# Patient Record
Sex: FEMALE | Race: BLACK OR AFRICAN AMERICAN | NOT HISPANIC OR LATINO | Employment: UNEMPLOYED | URBAN - METROPOLITAN AREA
[De-identification: names, ages, dates, MRNs, and addresses within clinical notes are randomized per-mention and may not be internally consistent; named-entity substitution may affect disease eponyms.]

---
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
## 2019-01-08 ENCOUNTER — HISTORICAL (OUTPATIENT)
Dept: ADMINISTRATIVE | Facility: HOSPITAL | Age: 19
End: 2019-01-08

## 2019-01-08 LAB
ALBUMIN SERPL-MCNC: 1.9 GM/DL (ref 3.4–5)
ALBUMIN/GLOB SERPL: 0 RATIO (ref 1–2)
ALP SERPL-CCNC: 115 UNIT/L (ref 45–117)
ALT SERPL-CCNC: <6 UNIT/L (ref 12–78)
AST SERPL-CCNC: 7 UNIT/L (ref 15–37)
BILIRUB SERPL-MCNC: 0.2 MG/DL (ref 0.2–1)
BILIRUBIN DIRECT+TOT PNL SERPL-MCNC: <0.1 MG/DL
BILIRUBIN DIRECT+TOT PNL SERPL-MCNC: ABNORMAL MG/DL
BUN SERPL-MCNC: 12 MG/DL (ref 7–18)
CALCIUM SERPL-MCNC: 8.4 MG/DL (ref 8.5–10.1)
CHLORIDE SERPL-SCNC: 93 MMOL/L (ref 98–107)
CO2 SERPL-SCNC: 28 MMOL/L (ref 21–32)
CREAT SERPL-MCNC: 0.8 MG/DL (ref 0.6–1.3)
DEPRECATED CALCIDIOL+CALCIFEROL SERPL-MC: 24.83 NG/ML (ref 30–80)
EST. AVERAGE GLUCOSE BLD GHB EST-MCNC: 192 MG/DL
GLOBULIN SER-MCNC: 5.9 GM/ML (ref 2.3–3.5)
GLUCOSE SERPL-MCNC: 426 MG/DL (ref 74–106)
HBA1C MFR BLD: 8.3 % (ref 4.2–6.3)
POTASSIUM SERPL-SCNC: 4.2 MMOL/L (ref 3.5–5.1)
PROT SERPL-MCNC: 7.8 GM/DL (ref 6.4–8.2)
SODIUM SERPL-SCNC: 132 MMOL/L (ref 136–145)

## 2019-03-12 ENCOUNTER — HISTORICAL (OUTPATIENT)
Dept: ADMINISTRATIVE | Facility: HOSPITAL | Age: 19
End: 2019-03-12

## 2019-03-12 LAB
ABS NEUT (OLG): 16.99 X10(3)/MCL (ref 2.1–9.2)
ALBUMIN SERPL-MCNC: 2.2 GM/DL (ref 3.4–5)
ALBUMIN/GLOB SERPL: 0.4 RATIO (ref 1.1–2)
ALP SERPL-CCNC: 150 UNIT/L (ref 45–117)
ALT SERPL-CCNC: 7 UNIT/L (ref 12–78)
ANISOCYTOSIS BLD QL SMEAR: ABNORMAL
AST SERPL-CCNC: 4 UNIT/L (ref 15–37)
BASOPHILS NFR BLD MANUAL: 0 %
BILIRUB SERPL-MCNC: 0.2 MG/DL (ref 0.2–1)
BILIRUBIN DIRECT+TOT PNL SERPL-MCNC: 0.1 MG/DL
BILIRUBIN DIRECT+TOT PNL SERPL-MCNC: 0.1 MG/DL
BUN SERPL-MCNC: 13 MG/DL (ref 7–18)
CALCIUM SERPL-MCNC: 8.7 MG/DL (ref 8.5–10.1)
CHLORIDE SERPL-SCNC: 86 MMOL/L (ref 98–107)
CO2 SERPL-SCNC: 34 MMOL/L (ref 21–32)
CREAT SERPL-MCNC: 1.1 MG/DL (ref 0.6–1.3)
EOSINOPHIL NFR BLD MANUAL: 0 %
ERYTHROCYTE [DISTWIDTH] IN BLOOD BY AUTOMATED COUNT: 16.9 % (ref 11.5–14.5)
GLOBULIN SER-MCNC: 5.5 GM/ML (ref 2.3–3.5)
GLUCOSE SERPL-MCNC: 888 MG/DL (ref 74–106)
GRANULOCYTES NFR BLD MANUAL: 88 % (ref 43–75)
HCT VFR BLD AUTO: 30.9 % (ref 35–46)
HGB BLD-MCNC: 9.2 GM/DL (ref 12–16)
HYPOCHROMIA BLD QL SMEAR: ABNORMAL
LYMPHOCYTES NFR BLD MANUAL: 7 % (ref 20.5–51.1)
MCH RBC QN AUTO: 26.8 PG (ref 26–34)
MCHC RBC AUTO-ENTMCNC: 29.8 GM/DL (ref 31–37)
MCV RBC AUTO: 90.1 FL (ref 80–100)
MONOCYTES NFR BLD MANUAL: 0 % (ref 2–9)
NEUTS BAND NFR BLD MANUAL: 5 % (ref 0–10)
PLATELET # BLD AUTO: 310 X10(3)/MCL (ref 130–400)
PLATELET # BLD EST: ADEQUATE 10*3/UL
PMV BLD AUTO: 9.5 FL (ref 7.4–10.4)
POLYCHROMASIA BLD QL SMEAR: ABNORMAL
POTASSIUM SERPL-SCNC: 4.5 MMOL/L (ref 3.5–5.1)
PREALB SERPL-MCNC: 8.6 MG/DL (ref 20–40)
PROT SERPL-MCNC: 7.7 GM/DL (ref 6.4–8.2)
RBC # BLD AUTO: 3.43 X10(6)/MCL (ref 4–5.2)
RBC MORPH BLD: ABNORMAL
SODIUM SERPL-SCNC: 125 MMOL/L (ref 136–145)
WBC # SPEC AUTO: 19 X10(3)/MCL (ref 4.5–11)

## 2019-05-13 ENCOUNTER — HISTORICAL (OUTPATIENT)
Dept: ADMINISTRATIVE | Facility: HOSPITAL | Age: 19
End: 2019-05-13

## 2019-05-13 LAB
ABS NEUT (OLG): 8.24 X10(3)/MCL
ALBUMIN SERPL-MCNC: 2.2 GM/DL (ref 3.4–5)
ALBUMIN/GLOB SERPL: 0.4 RATIO (ref 1.1–2)
ALP SERPL-CCNC: 149 UNIT/L (ref 45–117)
ALT SERPL-CCNC: 37 UNIT/L (ref 12–78)
ANISOCYTOSIS BLD QL SMEAR: NORMAL
AST SERPL-CCNC: 20 UNIT/L (ref 15–37)
BASOPHILS # BLD AUTO: 0.01 X10(3)/MCL
BASOPHILS NFR BLD AUTO: 0 %
BILIRUB SERPL-MCNC: 0.2 MG/DL (ref 0.2–1)
BILIRUBIN DIRECT+TOT PNL SERPL-MCNC: <0.1 MG/DL
BILIRUBIN DIRECT+TOT PNL SERPL-MCNC: ABNORMAL MG/DL
BUN SERPL-MCNC: 23 MG/DL (ref 7–18)
CALCIUM SERPL-MCNC: 9 MG/DL (ref 8.5–10.1)
CHLORIDE SERPL-SCNC: 96 MMOL/L (ref 98–107)
CO2 SERPL-SCNC: 31 MMOL/L (ref 21–32)
CREAT SERPL-MCNC: 0.8 MG/DL (ref 0.6–1.3)
CRP SERPL-MCNC: 8.9 MG/DL
EOSINOPHIL # BLD AUTO: 0.24 X10(3)/MCL
EOSINOPHIL NFR BLD AUTO: 2 %
ERYTHROCYTE [DISTWIDTH] IN BLOOD BY AUTOMATED COUNT: 18.3 % (ref 11.5–14.5)
ERYTHROCYTE [SEDIMENTATION RATE] IN BLOOD: 106 MM/HR (ref 0–20)
GLOBULIN SER-MCNC: 6 GM/ML (ref 2.3–3.5)
GLUCOSE SERPL-MCNC: 496 MG/DL (ref 74–106)
HBV CORE IGM SERPL QL IA: NONREACTIVE
HCT VFR BLD AUTO: 29.5 % (ref 35–46)
HGB BLD-MCNC: 8.8 GM/DL (ref 12–16)
HYPOCHROMIA BLD QL SMEAR: NORMAL
IMM GRANULOCYTES # BLD AUTO: 0.1 10*3/UL
IMM GRANULOCYTES NFR BLD AUTO: 1 %
LYMPHOCYTES # BLD AUTO: 1.9 X10(3)/MCL
LYMPHOCYTES NFR BLD AUTO: 17 % (ref 13–40)
MACROCYTES BLD QL SMEAR: NORMAL
MCH RBC QN AUTO: 28.4 PG (ref 26–34)
MCHC RBC AUTO-ENTMCNC: 29.8 GM/DL (ref 31–37)
MCV RBC AUTO: 95.2 FL (ref 80–100)
MICROCYTES BLD QL SMEAR: NORMAL
MONOCYTES # BLD AUTO: 0.41 X10(3)/MCL
MONOCYTES NFR BLD AUTO: 4 % (ref 0–10)
NEUTROPHILS # BLD AUTO: 8.24 X10(3)/MCL
NEUTROPHILS NFR BLD AUTO: 76 %
PLATELET # BLD AUTO: 453 X10(3)/MCL (ref 130–400)
PLATELET # BLD EST: NORMAL 10*3/UL
PMV BLD AUTO: 8.4 FL (ref 7.4–10.4)
POLYCHROMASIA BLD QL SMEAR: NORMAL
POTASSIUM SERPL-SCNC: 4.1 MMOL/L (ref 3.5–5.1)
PROT SERPL-MCNC: 8.2 GM/DL (ref 6.4–8.2)
RBC # BLD AUTO: 3.1 X10(6)/MCL (ref 4–5.2)
RBC MORPH BLD: NORMAL
SODIUM SERPL-SCNC: 133 MMOL/L (ref 136–145)
WBC # SPEC AUTO: 10.9 X10(3)/MCL (ref 4.5–11)

## 2019-06-11 ENCOUNTER — HISTORICAL (OUTPATIENT)
Dept: ADMINISTRATIVE | Facility: HOSPITAL | Age: 19
End: 2019-06-11

## 2019-06-11 LAB
ABS NEUT (OLG): 12.77 X10(3)/MCL
ALBUMIN SERPL-MCNC: 1.7 GM/DL (ref 3.4–5)
ALBUMIN/GLOB SERPL: 0.3 RATIO (ref 1.1–2)
ALP SERPL-CCNC: 109 UNIT/L (ref 45–117)
ALT SERPL-CCNC: 9 UNIT/L (ref 12–78)
ANISOCYTOSIS BLD QL SMEAR: ABNORMAL
APTT PPP: 35.4 SECOND(S) (ref 23.3–37)
AST SERPL-CCNC: 8 UNIT/L (ref 15–37)
BASOPHILS NFR BLD MANUAL: 1 %
BILIRUB SERPL-MCNC: 0.2 MG/DL (ref 0.2–1)
BILIRUBIN DIRECT+TOT PNL SERPL-MCNC: 0.1 MG/DL
BILIRUBIN DIRECT+TOT PNL SERPL-MCNC: 0.1 MG/DL
BUN SERPL-MCNC: 22 MG/DL (ref 7–18)
CALCIUM SERPL-MCNC: 8.7 MG/DL (ref 8.5–10.1)
CHLORIDE SERPL-SCNC: 94 MMOL/L (ref 98–107)
CO2 SERPL-SCNC: 31 MMOL/L (ref 21–32)
CREAT SERPL-MCNC: 0.7 MG/DL (ref 0.6–1.3)
DEPRECATED CALCIDIOL+CALCIFEROL SERPL-MC: 20.27 NG/ML (ref 30–80)
EOSINOPHIL NFR BLD MANUAL: 0 %
ERYTHROCYTE [DISTWIDTH] IN BLOOD BY AUTOMATED COUNT: 17.9 % (ref 11.5–14.5)
EST. AVERAGE GLUCOSE BLD GHB EST-MCNC: 243 MG/DL
GLOBULIN SER-MCNC: 6.7 GM/ML (ref 2.3–3.5)
GLUCOSE SERPL-MCNC: 416 MG/DL (ref 74–106)
GRANULOCYTES NFR BLD MANUAL: 97 % (ref 43–75)
HBA1C MFR BLD: 10.1 % (ref 4.2–6.3)
HCT VFR BLD AUTO: 26.6 % (ref 35–46)
HGB BLD-MCNC: 8.1 GM/DL (ref 12–16)
HYPOCHROMIA BLD QL SMEAR: ABNORMAL
INR PPP: 1.22 (ref 0.9–1.2)
IRON SATN MFR SERPL: 10.8 % (ref 15–50)
IRON SERPL-MCNC: 17 MCG/DL (ref 50–170)
LYMPHOCYTES NFR BLD MANUAL: 2 % (ref 20.5–51.1)
MCH RBC QN AUTO: 25.8 PG (ref 26–34)
MCHC RBC AUTO-ENTMCNC: 30.5 GM/DL (ref 31–37)
MCV RBC AUTO: 84.7 FL (ref 80–100)
MICROCYTES BLD QL SMEAR: ABNORMAL
MONOCYTES NFR BLD MANUAL: 0 % (ref 2–9)
PLATELET # BLD AUTO: 452 X10(3)/MCL (ref 130–400)
PLATELET # BLD EST: ABNORMAL 10*3/UL
PMV BLD AUTO: 9.2 FL (ref 7.4–10.4)
POTASSIUM SERPL-SCNC: 4.3 MMOL/L (ref 3.5–5.1)
PREALB SERPL-MCNC: 8.3 MG/DL (ref 20–40)
PROT SERPL-MCNC: 8.4 GM/DL (ref 6.4–8.2)
PROTHROMBIN TIME: 15.3 SECOND(S) (ref 11.9–14.4)
RBC # BLD AUTO: 3.14 X10(6)/MCL (ref 4–5.2)
RBC MORPH BLD: ABNORMAL
SODIUM SERPL-SCNC: 130 MMOL/L (ref 136–145)
TIBC SERPL-MCNC: 157 MCG/DL (ref 250–450)
TRANSFERRIN SERPL-MCNC: 120 MG/DL (ref 200–360)
VIT B12 SERPL-MCNC: 1552 PG/ML (ref 193–986)
WBC # SPEC AUTO: 13.8 X10(3)/MCL (ref 4.5–11)

## 2019-06-17 ENCOUNTER — HISTORICAL (OUTPATIENT)
Dept: ADMINISTRATIVE | Facility: HOSPITAL | Age: 19
End: 2019-06-17

## 2019-06-17 LAB
NEG CONT SPOT COUNT: NORMAL
PANEL A SPOT COUNT: 0
PANEL B SPOT COUNT: 0
POS CONT SPOT COUNT: NORMAL
T-SPOT.TB: NORMAL

## 2019-07-11 ENCOUNTER — HISTORICAL (OUTPATIENT)
Dept: ADMINISTRATIVE | Facility: HOSPITAL | Age: 19
End: 2019-07-11

## 2019-07-11 LAB
ABS NEUT (OLG): 12.91 X10(3)/MCL (ref 2.1–9.2)
ALBUMIN SERPL-MCNC: 1.8 GM/DL (ref 3.4–5)
ALBUMIN/GLOB SERPL: 0.3 RATIO (ref 1.1–2)
ALP SERPL-CCNC: 115 UNIT/L (ref 45–117)
ALT SERPL-CCNC: 7 UNIT/L (ref 12–78)
AST SERPL-CCNC: 12 UNIT/L (ref 15–37)
BASOPHILS # BLD AUTO: 0.03 X10(3)/MCL
BASOPHILS NFR BLD AUTO: 0 %
BILIRUB SERPL-MCNC: 0.4 MG/DL (ref 0.2–1)
BILIRUBIN DIRECT+TOT PNL SERPL-MCNC: 0.2 MG/DL
BILIRUBIN DIRECT+TOT PNL SERPL-MCNC: 0.2 MG/DL
BUN SERPL-MCNC: 15 MG/DL (ref 7–18)
CALCIUM SERPL-MCNC: 8.8 MG/DL (ref 8.5–10.1)
CHLORIDE SERPL-SCNC: 89 MMOL/L (ref 98–107)
CO2 SERPL-SCNC: 34 MMOL/L (ref 21–32)
CREAT SERPL-MCNC: 0.6 MG/DL (ref 0.6–1.3)
EOSINOPHIL # BLD AUTO: 0.07 X10(3)/MCL
EOSINOPHIL NFR BLD AUTO: 0 %
ERYTHROCYTE [DISTWIDTH] IN BLOOD BY AUTOMATED COUNT: 18.2 % (ref 11.5–14.5)
EST. AVERAGE GLUCOSE BLD GHB EST-MCNC: 263 MG/DL
GLOBULIN SER-MCNC: 6.9 GM/ML (ref 2.3–3.5)
GLUCOSE SERPL-MCNC: 348 MG/DL (ref 74–106)
HBA1C MFR BLD: 10.8 % (ref 4.2–6.3)
HCT VFR BLD AUTO: 29.1 % (ref 35–46)
HGB BLD-MCNC: 8.7 GM/DL (ref 12–16)
IMM GRANULOCYTES # BLD AUTO: 0.07 10*3/UL
IMM GRANULOCYTES NFR BLD AUTO: 0 %
LYMPHOCYTES # BLD AUTO: 1.23 X10(3)/MCL
LYMPHOCYTES NFR BLD AUTO: 8 % (ref 13–40)
MCH RBC QN AUTO: 24.8 PG (ref 26–34)
MCHC RBC AUTO-ENTMCNC: 29.9 GM/DL (ref 31–37)
MCV RBC AUTO: 82.9 FL (ref 80–100)
MONOCYTES # BLD AUTO: 0.9 X10(3)/MCL
MONOCYTES NFR BLD AUTO: 6 % (ref 0–10)
NEUTROPHILS # BLD AUTO: 12.91 X10(3)/MCL
NEUTROPHILS NFR BLD AUTO: 85 X10(3)/MCL
PLATELET # BLD AUTO: 350 X10(3)/MCL (ref 130–400)
PMV BLD AUTO: 8.8 FL (ref 7.4–10.4)
POTASSIUM SERPL-SCNC: 4.6 MMOL/L (ref 3.5–5.1)
PROT SERPL-MCNC: 8.7 GM/DL (ref 6.4–8.2)
RBC # BLD AUTO: 3.51 X10(6)/MCL (ref 4–5.2)
SODIUM SERPL-SCNC: 127 MMOL/L (ref 136–145)
WBC # SPEC AUTO: 15.2 X10(3)/MCL (ref 4.5–11)

## 2019-07-16 LAB — FINAL CULTURE: NORMAL

## 2019-08-06 ENCOUNTER — HISTORICAL (OUTPATIENT)
Dept: ADMINISTRATIVE | Facility: HOSPITAL | Age: 19
End: 2019-08-06

## 2019-08-06 LAB
ABS NEUT (OLG): 10.56 X10(3)/MCL (ref 2.1–9.2)
ALBUMIN SERPL-MCNC: 1.6 GM/DL (ref 3.4–5)
ALBUMIN/GLOB SERPL: 0.2 RATIO (ref 1.1–2)
ALP SERPL-CCNC: 120 UNIT/L (ref 45–117)
ALT SERPL-CCNC: 9 UNIT/L (ref 12–78)
AST SERPL-CCNC: 21 UNIT/L (ref 15–37)
BASOPHILS # BLD AUTO: 0.03 X10(3)/MCL
BASOPHILS NFR BLD AUTO: 0 %
BILIRUB SERPL-MCNC: 0.3 MG/DL (ref 0.2–1)
BILIRUBIN DIRECT+TOT PNL SERPL-MCNC: <0.1 MG/DL
BILIRUBIN DIRECT+TOT PNL SERPL-MCNC: ABNORMAL MG/DL
BUN SERPL-MCNC: 18 MG/DL (ref 7–18)
CALCIUM SERPL-MCNC: 9 MG/DL (ref 8.5–10.1)
CHLORIDE SERPL-SCNC: 102 MMOL/L (ref 98–107)
CO2 SERPL-SCNC: 28 MMOL/L (ref 21–32)
CREAT SERPL-MCNC: 0.5 MG/DL (ref 0.6–1.3)
EOSINOPHIL # BLD AUTO: 0.23 X10(3)/MCL
EOSINOPHIL NFR BLD AUTO: 2 %
ERYTHROCYTE [DISTWIDTH] IN BLOOD BY AUTOMATED COUNT: 17 % (ref 11.5–14.5)
GLOBULIN SER-MCNC: 7.5 GM/ML (ref 2.3–3.5)
GLUCOSE SERPL-MCNC: 226 MG/DL (ref 74–106)
HCT VFR BLD AUTO: 23.8 % (ref 35–46)
HGB BLD-MCNC: 6.8 GM/DL (ref 12–16)
IMM GRANULOCYTES # BLD AUTO: 0.05 10*3/UL
IMM GRANULOCYTES NFR BLD AUTO: 0 %
LYMPHOCYTES # BLD AUTO: 1.28 X10(3)/MCL
LYMPHOCYTES NFR BLD AUTO: 10 % (ref 13–40)
MCH RBC QN AUTO: 24 PG (ref 26–34)
MCHC RBC AUTO-ENTMCNC: 28.6 GM/DL (ref 31–37)
MCV RBC AUTO: 84.1 FL (ref 80–100)
MONOCYTES # BLD AUTO: 0.53 X10(3)/MCL
MONOCYTES NFR BLD AUTO: 4 % (ref 0–24)
NEUTROPHILS # BLD AUTO: 10.56 X10(3)/MCL
NEUTROPHILS NFR BLD AUTO: 83 X10(3)/MCL
PLATELET # BLD AUTO: 376 X10(3)/MCL (ref 130–400)
PMV BLD AUTO: 9.3 FL (ref 7.4–10.4)
POTASSIUM SERPL-SCNC: 4.5 MMOL/L (ref 3.5–5.1)
PROT SERPL-MCNC: 9.1 GM/DL (ref 6.4–8.2)
RBC # BLD AUTO: 2.83 X10(6)/MCL (ref 4–5.2)
SODIUM SERPL-SCNC: 134 MMOL/L (ref 136–145)
WBC # SPEC AUTO: 12.7 X10(3)/MCL (ref 4.5–11)

## 2019-08-07 ENCOUNTER — HISTORICAL (OUTPATIENT)
Dept: INFUSION THERAPY | Facility: HOSPITAL | Age: 19
End: 2019-08-07

## 2019-08-07 LAB
CROSSMATCH INTERPRETATION: NORMAL
PRODUCT READY: NORMAL
TRANSFUSION ORDER: NORMAL

## 2019-10-01 ENCOUNTER — HISTORICAL (OUTPATIENT)
Dept: ADMINISTRATIVE | Facility: HOSPITAL | Age: 19
End: 2019-10-01

## 2019-10-01 LAB
ABS NEUT (OLG): 8.77 X10(3)/MCL (ref 2.1–9.2)
ANISOCYTOSIS BLD QL SMEAR: NORMAL
BASOPHILS # BLD AUTO: 0 X10(3)/MCL (ref 0–0.2)
BASOPHILS NFR BLD AUTO: 0 %
BUN SERPL-MCNC: 18 MG/DL (ref 7–18)
CALCIUM SERPL-MCNC: 9.1 MG/DL (ref 8.5–10.1)
CHLORIDE SERPL-SCNC: 94 MMOL/L (ref 98–107)
CO2 SERPL-SCNC: 31 MMOL/L (ref 21–32)
CREAT SERPL-MCNC: 0.5 MG/DL (ref 0.6–1.3)
CREAT/UREA NIT SERPL: 36
EOSINOPHIL # BLD AUTO: 0.5 X10(3)/MCL (ref 0–0.9)
EOSINOPHIL NFR BLD AUTO: 4 %
ERYTHROCYTE [DISTWIDTH] IN BLOOD BY AUTOMATED COUNT: 19.2 % (ref 11.5–14.5)
GLUCOSE SERPL-MCNC: 292 MG/DL (ref 74–106)
HCT VFR BLD AUTO: 26.6 % (ref 35–46)
HGB BLD-MCNC: 7.9 GM/DL (ref 12–16)
IMM GRANULOCYTES # BLD AUTO: 0.05 10*3/UL
IMM GRANULOCYTES NFR BLD AUTO: 0 %
LYMPHOCYTES # BLD AUTO: 1.4 X10(3)/MCL (ref 0.6–4.6)
LYMPHOCYTES NFR BLD AUTO: 13 %
MCH RBC QN AUTO: 28 PG (ref 26–34)
MCHC RBC AUTO-ENTMCNC: 29.7 GM/DL (ref 31–37)
MCV RBC AUTO: 94.3 FL (ref 80–100)
MICROCYTES BLD QL SMEAR: NORMAL
MONOCYTES # BLD AUTO: 0.5 X10(3)/MCL (ref 0.1–1.3)
MONOCYTES NFR BLD AUTO: 4 %
NEUTROPHILS # BLD AUTO: 8.77 X10(3)/MCL (ref 2.1–9.2)
NEUTROPHILS NFR BLD AUTO: 78 %
PLATELET # BLD AUTO: 544 X10(3)/MCL (ref 130–400)
PLATELET # BLD EST: NORMAL 10*3/UL
PMV BLD AUTO: 9.4 FL (ref 7.4–10.4)
POTASSIUM SERPL-SCNC: 4.7 MMOL/L (ref 3.5–5.1)
RBC # BLD AUTO: 2.82 X10(6)/MCL (ref 4–5.2)
RBC MORPH BLD: NORMAL
SODIUM SERPL-SCNC: 130 MMOL/L (ref 136–145)
TOBRAMYCIN TROUGH SERPL-MCNC: <0.3 MCG/ML (ref 0–2)
WBC # SPEC AUTO: 11.3 X10(3)/MCL (ref 4.5–11)

## 2019-10-08 ENCOUNTER — HISTORICAL (OUTPATIENT)
Dept: LAB | Facility: HOSPITAL | Age: 19
End: 2019-10-08

## 2019-10-08 LAB
ABS NEUT (OLG): 7.45 X10(3)/MCL (ref 2.1–9.2)
ANISOCYTOSIS BLD QL SMEAR: NORMAL
BASOPHILS # BLD AUTO: 0 X10(3)/MCL (ref 0–0.2)
BASOPHILS NFR BLD AUTO: 0 %
BUN SERPL-MCNC: 24 MG/DL (ref 7–18)
CALCIUM SERPL-MCNC: 9.3 MG/DL (ref 8.5–10.1)
CHLORIDE SERPL-SCNC: 92 MMOL/L (ref 98–107)
CO2 SERPL-SCNC: 34 MMOL/L (ref 21–32)
CREAT SERPL-MCNC: 0.5 MG/DL (ref 0.6–1.3)
CREAT/UREA NIT SERPL: 48
EOSINOPHIL # BLD AUTO: 0.4 X10(3)/MCL (ref 0–0.9)
EOSINOPHIL NFR BLD AUTO: 4 %
ERYTHROCYTE [DISTWIDTH] IN BLOOD BY AUTOMATED COUNT: 17.4 % (ref 11.5–14.5)
GLUCOSE SERPL-MCNC: 104 MG/DL (ref 74–106)
HCT VFR BLD AUTO: 27.7 % (ref 35–46)
HGB BLD-MCNC: 8.2 GM/DL (ref 12–16)
HYPOCHROMIA BLD QL SMEAR: NORMAL
IMM GRANULOCYTES # BLD AUTO: 0.04 10*3/UL
IMM GRANULOCYTES NFR BLD AUTO: 0 %
LYMPHOCYTES # BLD AUTO: 1.7 X10(3)/MCL (ref 0.6–4.6)
LYMPHOCYTES NFR BLD AUTO: 17 %
MCH RBC QN AUTO: 27.8 PG (ref 26–34)
MCHC RBC AUTO-ENTMCNC: 29.6 GM/DL (ref 31–37)
MCV RBC AUTO: 93.9 FL (ref 80–100)
MONOCYTES # BLD AUTO: 0.5 X10(3)/MCL (ref 0.1–1.3)
MONOCYTES NFR BLD AUTO: 5 %
NEUTROPHILS # BLD AUTO: 7.45 X10(3)/MCL (ref 2.1–9.2)
NEUTROPHILS NFR BLD AUTO: 74 %
PLATELET # BLD AUTO: 452 X10(3)/MCL (ref 130–400)
PLATELET # BLD EST: NORMAL 10*3/UL
PMV BLD AUTO: 9.8 FL (ref 7.4–10.4)
POLYCHROMASIA BLD QL SMEAR: NORMAL
POTASSIUM SERPL-SCNC: 4.3 MMOL/L (ref 3.5–5.1)
RBC # BLD AUTO: 2.95 X10(6)/MCL (ref 4–5.2)
RBC MORPH BLD: NORMAL
SODIUM SERPL-SCNC: 130 MMOL/L (ref 136–145)
TOBRAMYCIN TROUGH SERPL-MCNC: <0.3 MCG/ML (ref 0–2)
WBC # SPEC AUTO: 10.1 X10(3)/MCL (ref 4.5–11)

## 2019-10-15 ENCOUNTER — HISTORICAL (OUTPATIENT)
Dept: ADMINISTRATIVE | Facility: HOSPITAL | Age: 19
End: 2019-10-15

## 2019-10-15 LAB
ABS NEUT (OLG): 10.55 X10(3)/MCL (ref 2.1–9.2)
ALBUMIN SERPL-MCNC: 1.8 GM/DL (ref 3.4–5)
ALBUMIN/GLOB SERPL: 0.2 RATIO (ref 1.1–2)
ALP SERPL-CCNC: 180 UNIT/L (ref 45–117)
ALT SERPL-CCNC: 15 UNIT/L (ref 12–78)
AST SERPL-CCNC: 21 UNIT/L (ref 15–37)
BASOPHILS # BLD AUTO: 0 X10(3)/MCL (ref 0–0.2)
BASOPHILS NFR BLD AUTO: 0 %
BILIRUB SERPL-MCNC: 0.3 MG/DL (ref 0.2–1)
BILIRUBIN DIRECT+TOT PNL SERPL-MCNC: <0.1 MG/DL (ref 0–0.2)
BILIRUBIN DIRECT+TOT PNL SERPL-MCNC: ABNORMAL MG/DL
BUN SERPL-MCNC: 23 MG/DL (ref 7–18)
CALCIUM SERPL-MCNC: 9.7 MG/DL (ref 8.5–10.1)
CHLORIDE SERPL-SCNC: 94 MMOL/L (ref 98–107)
CK SERPL-CCNC: 33 UNIT/L (ref 26–192)
CO2 SERPL-SCNC: 32 MMOL/L (ref 21–32)
CREAT SERPL-MCNC: 0.6 MG/DL (ref 0.6–1.3)
CRP SERPL-MCNC: 17 MG/DL
EOSINOPHIL # BLD AUTO: 0.3 X10(3)/MCL (ref 0–0.9)
EOSINOPHIL NFR BLD AUTO: 2 %
ERYTHROCYTE [DISTWIDTH] IN BLOOD BY AUTOMATED COUNT: 16 % (ref 11.5–14.5)
ERYTHROCYTE [SEDIMENTATION RATE] IN BLOOD: 111 MM/HR (ref 0–20)
GLOBULIN SER-MCNC: 9 GM/ML (ref 2.3–3.5)
GLUCOSE SERPL-MCNC: 91 MG/DL (ref 74–106)
HCT VFR BLD AUTO: 29.1 % (ref 35–46)
HGB BLD-MCNC: 8.8 GM/DL (ref 12–16)
IMM GRANULOCYTES # BLD AUTO: 0.05 10*3/UL
IMM GRANULOCYTES NFR BLD AUTO: 0 %
LYMPHOCYTES # BLD AUTO: 1.7 X10(3)/MCL (ref 0.6–4.6)
LYMPHOCYTES NFR BLD AUTO: 13 %
MCH RBC QN AUTO: 27.5 PG (ref 26–34)
MCHC RBC AUTO-ENTMCNC: 30.2 GM/DL (ref 31–37)
MCV RBC AUTO: 90.9 FL (ref 80–100)
MONOCYTES # BLD AUTO: 0.5 X10(3)/MCL (ref 0.1–1.3)
MONOCYTES NFR BLD AUTO: 4 %
NEUTROPHILS # BLD AUTO: 10.55 X10(3)/MCL (ref 2.1–9.2)
NEUTROPHILS NFR BLD AUTO: 80 %
PLATELET # BLD AUTO: 477 X10(3)/MCL (ref 130–400)
PMV BLD AUTO: 8.6 FL (ref 7.4–10.4)
POTASSIUM SERPL-SCNC: 4.5 MMOL/L (ref 3.5–5.1)
PROT SERPL-MCNC: 10.8 GM/DL (ref 6.4–8.2)
RBC # BLD AUTO: 3.2 X10(6)/MCL (ref 4–5.2)
SODIUM SERPL-SCNC: 130 MMOL/L (ref 136–145)
TSH SERPL-ACNC: 1.91 MIU/L (ref 0.36–3.74)
WBC # SPEC AUTO: 13.2 X10(3)/MCL (ref 4.5–11)

## 2019-10-23 ENCOUNTER — HISTORICAL (OUTPATIENT)
Dept: RADIOLOGY | Facility: HOSPITAL | Age: 19
End: 2019-10-23

## 2019-11-11 ENCOUNTER — HISTORICAL (OUTPATIENT)
Dept: LAB | Facility: HOSPITAL | Age: 19
End: 2019-11-11

## 2019-11-11 LAB
ABS NEUT (OLG): 6.39 X10(3)/MCL (ref 2.1–9.2)
ALBUMIN SERPL-MCNC: 1.7 GM/DL (ref 3.4–5)
ALBUMIN/GLOB SERPL: 0.2 RATIO (ref 1.1–2)
ALP SERPL-CCNC: 163 UNIT/L (ref 45–117)
ALT SERPL-CCNC: 14 UNIT/L (ref 12–78)
AST SERPL-CCNC: 12 UNIT/L (ref 15–37)
BASOPHILS # BLD AUTO: 0 X10(3)/MCL (ref 0–0.2)
BASOPHILS NFR BLD AUTO: 0 %
BILIRUB SERPL-MCNC: 0.2 MG/DL (ref 0.2–1)
BILIRUBIN DIRECT+TOT PNL SERPL-MCNC: <0.1 MG/DL (ref 0–0.2)
BILIRUBIN DIRECT+TOT PNL SERPL-MCNC: ABNORMAL MG/DL
BUN SERPL-MCNC: 20 MG/DL (ref 7–18)
CALCIUM SERPL-MCNC: 8.8 MG/DL (ref 8.5–10.1)
CHLORIDE SERPL-SCNC: 91 MMOL/L (ref 98–107)
CO2 SERPL-SCNC: 32 MMOL/L (ref 21–32)
CREAT SERPL-MCNC: 0.7 MG/DL (ref 0.6–1.3)
EOSINOPHIL # BLD AUTO: 0.3 X10(3)/MCL (ref 0–0.9)
EOSINOPHIL NFR BLD AUTO: 3 %
ERYTHROCYTE [DISTWIDTH] IN BLOOD BY AUTOMATED COUNT: 14.3 % (ref 11.5–14.5)
GLOBULIN SER-MCNC: 6.8 GM/ML (ref 2.3–3.5)
GLUCOSE SERPL-MCNC: 480 MG/DL (ref 74–106)
HCT VFR BLD AUTO: 31.4 % (ref 35–46)
HGB BLD-MCNC: 9.7 GM/DL (ref 12–16)
IMM GRANULOCYTES # BLD AUTO: 0.03 10*3/UL
IMM GRANULOCYTES NFR BLD AUTO: 0 %
LYMPHOCYTES # BLD AUTO: 1.2 X10(3)/MCL (ref 0.6–4.6)
LYMPHOCYTES NFR BLD AUTO: 14 %
MCH RBC QN AUTO: 28.2 PG (ref 26–34)
MCHC RBC AUTO-ENTMCNC: 30.9 GM/DL (ref 31–37)
MCV RBC AUTO: 91.3 FL (ref 80–100)
MONOCYTES # BLD AUTO: 0.5 X10(3)/MCL (ref 0.1–1.3)
MONOCYTES NFR BLD AUTO: 6 %
NEUTROPHILS # BLD AUTO: 6.39 X10(3)/MCL (ref 2.1–9.2)
NEUTROPHILS NFR BLD AUTO: 76 %
PLATELET # BLD AUTO: 289 X10(3)/MCL (ref 130–400)
PMV BLD AUTO: 10 FL (ref 7.4–10.4)
POTASSIUM SERPL-SCNC: 4.6 MMOL/L (ref 3.5–5.1)
PROT SERPL-MCNC: 8.5 GM/DL (ref 6.4–8.2)
RBC # BLD AUTO: 3.44 X10(6)/MCL (ref 4–5.2)
SODIUM SERPL-SCNC: 130 MMOL/L (ref 136–145)
WBC # SPEC AUTO: 8.4 X10(3)/MCL (ref 4.5–11)

## 2019-12-09 ENCOUNTER — HISTORICAL (OUTPATIENT)
Dept: RADIOLOGY | Facility: HOSPITAL | Age: 19
End: 2019-12-09

## 2019-12-11 ENCOUNTER — HISTORICAL (OUTPATIENT)
Dept: ADMINISTRATIVE | Facility: HOSPITAL | Age: 19
End: 2019-12-11

## 2019-12-11 ENCOUNTER — HISTORICAL (OUTPATIENT)
Dept: RADIOLOGY | Facility: HOSPITAL | Age: 19
End: 2019-12-11

## 2019-12-11 LAB
ABS NEUT (OLG): 7.34 X10(3)/MCL (ref 2.1–9.2)
ALBUMIN SERPL-MCNC: 1.8 GM/DL (ref 3.4–5)
ALBUMIN/GLOB SERPL: 0.2 RATIO (ref 1.1–2)
ALP SERPL-CCNC: 206 UNIT/L (ref 45–117)
ALT SERPL-CCNC: 29 UNIT/L (ref 12–78)
AST SERPL-CCNC: 28 UNIT/L (ref 15–37)
BASOPHILS # BLD AUTO: 0 X10(3)/MCL (ref 0–0.2)
BASOPHILS NFR BLD AUTO: 0 %
BILIRUB SERPL-MCNC: 0.2 MG/DL (ref 0.2–1)
BILIRUBIN DIRECT+TOT PNL SERPL-MCNC: <0.1 MG/DL (ref 0–0.2)
BILIRUBIN DIRECT+TOT PNL SERPL-MCNC: ABNORMAL MG/DL
BUN SERPL-MCNC: 18 MG/DL (ref 7–18)
CALCIUM SERPL-MCNC: 9.3 MG/DL (ref 8.5–10.1)
CHLORIDE SERPL-SCNC: 96 MMOL/L (ref 98–107)
CK SERPL-CCNC: 31 UNIT/L (ref 26–192)
CO2 SERPL-SCNC: 31 MMOL/L (ref 21–32)
CREAT SERPL-MCNC: 0.7 MG/DL (ref 0.6–1.3)
CREAT UR-MCNC: 52 MG/DL
CRP SERPL-MCNC: 17 MG/DL
DEPRECATED CALCIDIOL+CALCIFEROL SERPL-MC: 43.93 NG/ML (ref 30–80)
EOSINOPHIL # BLD AUTO: 0.2 X10(3)/MCL (ref 0–0.9)
EOSINOPHIL NFR BLD AUTO: 3 %
ERYTHROCYTE [DISTWIDTH] IN BLOOD BY AUTOMATED COUNT: 14.8 % (ref 11.5–14.5)
ERYTHROCYTE [SEDIMENTATION RATE] IN BLOOD: 114 MM/HR (ref 0–20)
GLOBULIN SER-MCNC: 7.6 GM/ML (ref 2.3–3.5)
GLUCOSE SERPL-MCNC: 330 MG/DL (ref 74–106)
HCT VFR BLD AUTO: 30.7 % (ref 35–46)
HGB BLD-MCNC: 9.2 GM/DL (ref 12–16)
IMM GRANULOCYTES # BLD AUTO: 0.04 10*3/UL
IMM GRANULOCYTES NFR BLD AUTO: 0 %
LYMPHOCYTES # BLD AUTO: 1.4 X10(3)/MCL (ref 0.6–4.6)
LYMPHOCYTES NFR BLD AUTO: 14 %
MCH RBC QN AUTO: 27 PG (ref 26–34)
MCHC RBC AUTO-ENTMCNC: 30 GM/DL (ref 31–37)
MCV RBC AUTO: 90 FL (ref 80–100)
MONOCYTES # BLD AUTO: 0.4 X10(3)/MCL (ref 0.1–1.3)
MONOCYTES NFR BLD AUTO: 4 %
NEUTROPHILS # BLD AUTO: 7.34 X10(3)/MCL (ref 2.1–9.2)
NEUTROPHILS NFR BLD AUTO: 78 %
PLATELET # BLD AUTO: 388 X10(3)/MCL (ref 130–400)
PMV BLD AUTO: 8.4 FL (ref 7.4–10.4)
POTASSIUM SERPL-SCNC: 4.6 MMOL/L (ref 3.5–5.1)
PROT SERPL-MCNC: 9.4 GM/DL (ref 6.4–8.2)
PROT UR STRIP-MCNC: 115.9 MG/DL
PROT/CREAT UR-RTO: 2228.8 MG/GM
RBC # BLD AUTO: 3.41 X10(6)/MCL (ref 4–5.2)
SODIUM SERPL-SCNC: 134 MMOL/L (ref 136–145)
TSH SERPL-ACNC: 2.18 MIU/L (ref 0.36–3.74)
WBC # SPEC AUTO: 9.4 X10(3)/MCL (ref 4.5–11)

## 2020-01-06 ENCOUNTER — HISTORICAL (OUTPATIENT)
Dept: ADMINISTRATIVE | Facility: HOSPITAL | Age: 20
End: 2020-01-06

## 2020-01-06 LAB
ABS NEUT (OLG): 7.83 X10(3)/MCL (ref 2.1–9.2)
ALBUMIN SERPL-MCNC: 2.2 GM/DL (ref 3.4–5)
ALBUMIN/GLOB SERPL: 0.3 RATIO (ref 1.1–2)
ALP SERPL-CCNC: 197 UNIT/L (ref 45–117)
ALT SERPL-CCNC: 15 UNIT/L (ref 12–78)
AST SERPL-CCNC: 14 UNIT/L (ref 15–37)
BASOPHILS # BLD AUTO: 0 X10(3)/MCL (ref 0–0.2)
BASOPHILS NFR BLD AUTO: 0 %
BILIRUB SERPL-MCNC: 0.3 MG/DL (ref 0.2–1)
BILIRUBIN DIRECT+TOT PNL SERPL-MCNC: 0.1 MG/DL (ref 0–0.2)
BILIRUBIN DIRECT+TOT PNL SERPL-MCNC: 0.2 MG/DL
BUN SERPL-MCNC: 17 MG/DL (ref 7–18)
CALCIUM SERPL-MCNC: 10.1 MG/DL (ref 8.5–10.1)
CHLORIDE SERPL-SCNC: 97 MMOL/L (ref 98–107)
CO2 SERPL-SCNC: 32 MMOL/L (ref 21–32)
CREAT SERPL-MCNC: 0.6 MG/DL (ref 0.6–1.3)
EOSINOPHIL # BLD AUTO: 0.4 X10(3)/MCL (ref 0–0.9)
EOSINOPHIL NFR BLD AUTO: 4 %
ERYTHROCYTE [DISTWIDTH] IN BLOOD BY AUTOMATED COUNT: 16 % (ref 11.5–14.5)
ERYTHROCYTE [SEDIMENTATION RATE] IN BLOOD: 108 MM/HR (ref 0–20)
EST. AVERAGE GLUCOSE BLD GHB EST-MCNC: 252 MG/DL
GLOBULIN SER-MCNC: 7 GM/ML (ref 2.3–3.5)
GLUCOSE SERPL-MCNC: 242 MG/DL (ref 74–106)
HBA1C MFR BLD: 10.4 % (ref 4.2–6.3)
HCT VFR BLD AUTO: 31.2 % (ref 35–46)
HGB BLD-MCNC: 9.6 GM/DL (ref 12–16)
IMM GRANULOCYTES # BLD AUTO: 0.03 10*3/UL
IMM GRANULOCYTES NFR BLD AUTO: 0 %
LYMPHOCYTES # BLD AUTO: 1.5 X10(3)/MCL (ref 0.6–4.6)
LYMPHOCYTES NFR BLD AUTO: 15 %
MCH RBC QN AUTO: 27.7 PG (ref 26–34)
MCHC RBC AUTO-ENTMCNC: 30.8 GM/DL (ref 31–37)
MCV RBC AUTO: 89.9 FL (ref 80–100)
MONOCYTES # BLD AUTO: 0.5 X10(3)/MCL (ref 0.1–1.3)
MONOCYTES NFR BLD AUTO: 5 %
NEUTROPHILS # BLD AUTO: 7.83 X10(3)/MCL (ref 2.1–9.2)
NEUTROPHILS NFR BLD AUTO: 76 %
PLATELET # BLD AUTO: 464 X10(3)/MCL (ref 130–400)
PMV BLD AUTO: 8.7 FL (ref 7.4–10.4)
POTASSIUM SERPL-SCNC: 4.2 MMOL/L (ref 3.5–5.1)
PROT SERPL-MCNC: 9.2 GM/DL (ref 6.4–8.2)
RBC # BLD AUTO: 3.47 X10(6)/MCL (ref 4–5.2)
SODIUM SERPL-SCNC: 131 MMOL/L (ref 136–145)
WBC # SPEC AUTO: 10.3 X10(3)/MCL (ref 4.5–11)

## 2020-02-07 ENCOUNTER — HISTORICAL (OUTPATIENT)
Dept: ADMINISTRATIVE | Facility: HOSPITAL | Age: 20
End: 2020-02-07

## 2020-02-07 LAB
ABS NEUT (OLG): 4.01 X10(3)/MCL (ref 2.1–9.2)
ALBUMIN SERPL-MCNC: 2 GM/DL (ref 3.4–5)
ALBUMIN/GLOB SERPL: 0.3 RATIO (ref 1.1–2)
ALP SERPL-CCNC: 168 UNIT/L (ref 45–117)
ALT SERPL-CCNC: 17 UNIT/L (ref 12–78)
AST SERPL-CCNC: 15 UNIT/L (ref 15–37)
BASOPHILS # BLD AUTO: 0 X10(3)/MCL (ref 0–0.2)
BASOPHILS NFR BLD AUTO: 0 %
BILIRUB SERPL-MCNC: 0.3 MG/DL (ref 0.2–1)
BILIRUBIN DIRECT+TOT PNL SERPL-MCNC: 0.1 MG/DL (ref 0–0.2)
BILIRUBIN DIRECT+TOT PNL SERPL-MCNC: 0.2 MG/DL
BUN SERPL-MCNC: 17 MG/DL (ref 7–18)
CALCIUM SERPL-MCNC: 10 MG/DL (ref 8.5–10.1)
CHLORIDE SERPL-SCNC: 98 MMOL/L (ref 98–107)
CO2 SERPL-SCNC: 34 MMOL/L (ref 21–32)
CREAT SERPL-MCNC: 0.5 MG/DL (ref 0.6–1.3)
CRP SERPL HS-MCNC: 127 MG/L (ref 0–3)
EOSINOPHIL # BLD AUTO: 0.5 X10(3)/MCL (ref 0–0.9)
EOSINOPHIL NFR BLD AUTO: 7 %
ERYTHROCYTE [DISTWIDTH] IN BLOOD BY AUTOMATED COUNT: 15.9 % (ref 11.5–14.5)
GLOBULIN SER-MCNC: 7.2 GM/ML (ref 2.3–3.5)
GLUCOSE SERPL-MCNC: 148 MG/DL (ref 74–106)
HCT VFR BLD AUTO: 28.5 % (ref 35–46)
HGB BLD-MCNC: 8.8 GM/DL (ref 12–16)
IMM GRANULOCYTES # BLD AUTO: 0.01 10*3/UL
IMM GRANULOCYTES NFR BLD AUTO: 0 %
LYMPHOCYTES # BLD AUTO: 1.4 X10(3)/MCL (ref 0.6–4.6)
LYMPHOCYTES NFR BLD AUTO: 22 %
MCH RBC QN AUTO: 28 PG (ref 26–34)
MCHC RBC AUTO-ENTMCNC: 30.9 GM/DL (ref 31–37)
MCV RBC AUTO: 90.8 FL (ref 80–100)
MONOCYTES # BLD AUTO: 0.4 X10(3)/MCL (ref 0.1–1.3)
MONOCYTES NFR BLD AUTO: 7 %
NEUTROPHILS # BLD AUTO: 4.01 X10(3)/MCL (ref 2.1–9.2)
NEUTROPHILS NFR BLD AUTO: 63 %
PLATELET # BLD AUTO: 382 X10(3)/MCL (ref 130–400)
PMV BLD AUTO: 8.6 FL (ref 7.4–10.4)
POTASSIUM SERPL-SCNC: 4.2 MMOL/L (ref 3.5–5.1)
PROT SERPL-MCNC: 9.2 GM/DL (ref 6.4–8.2)
RBC # BLD AUTO: 3.14 X10(6)/MCL (ref 4–5.2)
SODIUM SERPL-SCNC: 136 MMOL/L (ref 136–145)
WBC # SPEC AUTO: 6.4 X10(3)/MCL (ref 4.5–11)

## 2020-05-12 ENCOUNTER — HISTORICAL (OUTPATIENT)
Dept: ADMINISTRATIVE | Facility: HOSPITAL | Age: 20
End: 2020-05-12

## 2020-05-12 LAB
ABS NEUT (OLG): 3.24 X10(3)/MCL (ref 2.1–9.2)
ALBUMIN SERPL-MCNC: 1.9 GM/DL (ref 3.4–5)
ALBUMIN/GLOB SERPL: 0.3 RATIO (ref 1.1–2)
ALP SERPL-CCNC: 179 UNIT/L (ref 45–117)
ALT SERPL-CCNC: 51 UNIT/L (ref 12–78)
APPEARANCE, UA: ABNORMAL
AST SERPL-CCNC: 40 UNIT/L (ref 15–37)
BACTERIA #/AREA URNS AUTO: ABNORMAL /HPF
BASOPHILS # BLD AUTO: 0 X10(3)/MCL (ref 0–0.2)
BASOPHILS NFR BLD AUTO: 0 %
BILIRUB SERPL-MCNC: 0.3 MG/DL (ref 0.2–1)
BILIRUB UR QL STRIP: NEGATIVE
BILIRUBIN DIRECT+TOT PNL SERPL-MCNC: 0.1 MG/DL (ref 0–0.2)
BILIRUBIN DIRECT+TOT PNL SERPL-MCNC: 0.2 MG/DL
BUN SERPL-MCNC: 23 MG/DL (ref 7–18)
CALCIUM SERPL-MCNC: 9.6 MG/DL (ref 8.5–10.1)
CHLORIDE SERPL-SCNC: 100 MMOL/L (ref 98–107)
CO2 SERPL-SCNC: 31 MMOL/L (ref 21–32)
COLOR UR: YELLOW
CREAT SERPL-MCNC: 0.6 MG/DL (ref 0.6–1.3)
CRP SERPL HS-MCNC: 137.24 MG/L (ref 0–5)
DEPRECATED CALCIDIOL+CALCIFEROL SERPL-MC: 41.3 NG/ML (ref 30–80)
EOSINOPHIL # BLD AUTO: 0.4 X10(3)/MCL (ref 0–0.9)
EOSINOPHIL NFR BLD AUTO: 7 %
ERYTHROCYTE [DISTWIDTH] IN BLOOD BY AUTOMATED COUNT: 15.1 % (ref 11.5–14.5)
ERYTHROCYTE [SEDIMENTATION RATE] IN BLOOD: 115 MM/HR (ref 0–20)
GLOBULIN SER-MCNC: 7.5 GM/ML (ref 2.3–3.5)
GLUCOSE (UA): NEGATIVE
GLUCOSE SERPL-MCNC: 167 MG/DL (ref 74–106)
HCT VFR BLD AUTO: 29.6 % (ref 35–46)
HGB BLD-MCNC: 8.8 GM/DL (ref 12–16)
HGB UR QL STRIP: 0.2
HYALINE CASTS #/AREA URNS LPF: ABNORMAL /LPF
IMM GRANULOCYTES # BLD AUTO: 0.01 10*3/UL
IMM GRANULOCYTES NFR BLD AUTO: 0 %
KETONES UR QL STRIP: NEGATIVE
LEUKOCYTE ESTERASE UR QL STRIP: 250 LEU/UL
LYMPHOCYTES # BLD AUTO: 1.5 X10(3)/MCL (ref 0.6–4.6)
LYMPHOCYTES NFR BLD AUTO: 27 %
MCH RBC QN AUTO: 26.1 PG (ref 26–34)
MCHC RBC AUTO-ENTMCNC: 29.7 GM/DL (ref 31–37)
MCV RBC AUTO: 87.8 FL (ref 80–100)
MONOCYTES # BLD AUTO: 0.4 X10(3)/MCL (ref 0.1–1.3)
MONOCYTES NFR BLD AUTO: 7 %
NEUTROPHILS # BLD AUTO: 3.24 X10(3)/MCL (ref 2.1–9.2)
NEUTROPHILS NFR BLD AUTO: 58 %
NITRITE UR QL STRIP: NEGATIVE
PH UR STRIP: 6 [PH] (ref 4.5–8)
PLATELET # BLD AUTO: 403 X10(3)/MCL (ref 130–400)
PMV BLD AUTO: 8.5 FL (ref 7.4–10.4)
POTASSIUM SERPL-SCNC: 4.1 MMOL/L (ref 3.5–5.1)
PROT SERPL-MCNC: 9.4 GM/DL (ref 6.4–8.2)
PROT UR QL STRIP: 100 MG/DL
RBC # BLD AUTO: 3.37 X10(6)/MCL (ref 4–5.2)
RBC #/AREA URNS AUTO: ABNORMAL /HPF
SODIUM SERPL-SCNC: 135 MMOL/L (ref 136–145)
SP GR UR STRIP: 1.03 (ref 1–1.03)
SQUAMOUS #/AREA URNS LPF: >100 /LPF
UROBILINOGEN UR STRIP-ACNC: NORMAL
WBC # SPEC AUTO: 5.6 X10(3)/MCL (ref 4.5–11)
WBC #/AREA URNS AUTO: ABNORMAL /HPF

## 2020-06-25 ENCOUNTER — HISTORICAL (OUTPATIENT)
Dept: ADMINISTRATIVE | Facility: HOSPITAL | Age: 20
End: 2020-06-25

## 2020-06-25 LAB
C3 SERPL-MCNC: 213 MG/DL (ref 80–173)
C4 SERPL-MCNC: 46.3 MG/DL (ref 13–46)
EST. AVERAGE GLUCOSE BLD GHB EST-MCNC: 171 MG/DL
HBA1C MFR BLD: 7.6 % (ref 4.2–6.3)
HBV SURFACE AG SERPL QL IA: NONREACTIVE
T4 FREE SERPL-MCNC: 1.45 NG/DL (ref 0.76–1.46)
TSH SERPL-ACNC: 1.03 MIU/L (ref 0.36–3.74)

## 2020-09-11 ENCOUNTER — HISTORICAL (OUTPATIENT)
Dept: ADMINISTRATIVE | Facility: HOSPITAL | Age: 20
End: 2020-09-11

## 2020-09-11 LAB
ABS NEUT (OLG): 4.53 X10(3)/MCL (ref 2.1–9.2)
ALBUMIN SERPL-MCNC: 2.2 GM/DL (ref 3.4–5)
ALBUMIN/GLOB SERPL: 0.3 RATIO (ref 1.1–2)
ALP SERPL-CCNC: 172 UNIT/L (ref 45–117)
ALT SERPL-CCNC: 48 UNIT/L (ref 12–78)
ANISOCYTOSIS BLD QL SMEAR: NORMAL
APPEARANCE, UA: ABNORMAL
AST SERPL-CCNC: 31 UNIT/L (ref 15–37)
BACTERIA #/AREA URNS AUTO: ABNORMAL /HPF
BASOPHILS # BLD AUTO: 0 X10(3)/MCL (ref 0–0.2)
BASOPHILS NFR BLD AUTO: 0 %
BILIRUB SERPL-MCNC: 0.5 MG/DL (ref 0.2–1)
BILIRUB UR QL STRIP: NEGATIVE
BILIRUBIN DIRECT+TOT PNL SERPL-MCNC: 0.2 MG/DL (ref 0–0.2)
BILIRUBIN DIRECT+TOT PNL SERPL-MCNC: 0.3 MG/DL
BUN SERPL-MCNC: 20 MG/DL (ref 7–18)
CALCIUM SERPL-MCNC: 9.4 MG/DL (ref 8.5–10.1)
CHLORIDE SERPL-SCNC: 99 MMOL/L (ref 98–107)
CO2 SERPL-SCNC: 27 MMOL/L (ref 21–32)
COLOR UR: ABNORMAL
CREAT SERPL-MCNC: 0.7 MG/DL (ref 0.6–1.3)
CRP SERPL HS-MCNC: 14.45 MG/DL
EOSINOPHIL # BLD AUTO: 0.3 X10(3)/MCL (ref 0–0.9)
EOSINOPHIL NFR BLD AUTO: 5 %
ERYTHROCYTE [DISTWIDTH] IN BLOOD BY AUTOMATED COUNT: 14.9 % (ref 11.5–14.5)
EST. AVERAGE GLUCOSE BLD GHB EST-MCNC: 154 MG/DL
GLOBULIN SER-MCNC: 6.7 GM/ML (ref 2.3–3.5)
GLUCOSE (UA): NEGATIVE
GLUCOSE SERPL-MCNC: 272 MG/DL (ref 74–106)
HBA1C MFR BLD: 7 % (ref 4.2–6.3)
HCT VFR BLD AUTO: 32.7 % (ref 35–46)
HGB BLD-MCNC: 9.6 GM/DL (ref 12–16)
HGB UR QL STRIP: 1 MG/DL
HYALINE CASTS #/AREA URNS LPF: ABNORMAL /LPF
IMM GRANULOCYTES # BLD AUTO: 0.01 10*3/UL
IMM GRANULOCYTES NFR BLD AUTO: 0 %
KETONES UR QL STRIP: NEGATIVE
LEUKOCYTE ESTERASE UR QL STRIP: 500 LEU/UL
LYMPHOCYTES # BLD AUTO: 1.2 X10(3)/MCL (ref 0.6–4.6)
LYMPHOCYTES NFR BLD AUTO: 18 %
MCH RBC QN AUTO: 26.6 PG (ref 26–34)
MCHC RBC AUTO-ENTMCNC: 29.4 GM/DL (ref 31–37)
MCV RBC AUTO: 90.6 FL (ref 80–100)
MONOCYTES # BLD AUTO: 0.4 X10(3)/MCL (ref 0.1–1.3)
MONOCYTES NFR BLD AUTO: 7 %
NEUTROPHILS # BLD AUTO: 4.53 X10(3)/MCL (ref 2.1–9.2)
NEUTROPHILS NFR BLD AUTO: 70 %
NITRITE UR QL STRIP: NEGATIVE
PH UR STRIP: 6 [PH] (ref 4.5–8)
PLATELET # BLD AUTO: 388 X10(3)/MCL (ref 130–400)
PLATELET # BLD EST: ADEQUATE 10*3/UL
PMV BLD AUTO: 9.1 FL (ref 7.4–10.4)
POTASSIUM SERPL-SCNC: 4.1 MMOL/L (ref 3.5–5.1)
PROT SERPL-MCNC: 8.9 GM/DL (ref 6.4–8.2)
PROT UR QL STRIP: 200 MG/DL
RBC # BLD AUTO: 3.61 X10(6)/MCL (ref 4–5.2)
RBC #/AREA URNS AUTO: ABNORMAL /HPF
RBC MORPH BLD: NORMAL
SODIUM SERPL-SCNC: 135 MMOL/L (ref 136–145)
SP GR UR STRIP: 1.04 (ref 1–1.03)
SQUAMOUS #/AREA URNS LPF: >100 /LPF
UROBILINOGEN UR STRIP-ACNC: 3 MG/DL
WBC # SPEC AUTO: 6.5 X10(3)/MCL (ref 4.5–11)
WBC #/AREA URNS AUTO: ABNORMAL /HPF

## 2020-11-13 ENCOUNTER — HISTORICAL (OUTPATIENT)
Dept: ADMINISTRATIVE | Facility: HOSPITAL | Age: 20
End: 2020-11-13

## 2020-11-13 LAB
ABS NEUT (OLG): 3.52 X10(3)/MCL (ref 2.1–9.2)
ALBUMIN SERPL-MCNC: 2 GM/DL (ref 3.5–5)
ALBUMIN/GLOB SERPL: 0.3 RATIO (ref 1.1–2)
ALP SERPL-CCNC: 162 UNIT/L (ref 40–150)
ALT SERPL-CCNC: 29 UNIT/L (ref 0–55)
AST SERPL-CCNC: 17 UNIT/L (ref 5–34)
BASOPHILS # BLD AUTO: 0 X10(3)/MCL (ref 0–0.2)
BASOPHILS NFR BLD AUTO: 0 %
BILIRUB SERPL-MCNC: 0.3 MG/DL
BILIRUBIN DIRECT+TOT PNL SERPL-MCNC: 0.1 MG/DL (ref 0–0.8)
BILIRUBIN DIRECT+TOT PNL SERPL-MCNC: 0.2 MG/DL (ref 0–0.5)
BUN SERPL-MCNC: 17 MG/DL (ref 7–18.7)
CALCIUM SERPL-MCNC: 9.2 MG/DL (ref 8.4–10.2)
CHLORIDE SERPL-SCNC: 101 MMOL/L (ref 98–107)
CO2 SERPL-SCNC: 29 MMOL/L (ref 22–29)
CREAT SERPL-MCNC: 0.6 MG/DL (ref 0.55–1.02)
CRP SERPL HS-MCNC: 10.47 MG/DL
EOSINOPHIL # BLD AUTO: 0.4 X10(3)/MCL (ref 0–0.9)
EOSINOPHIL NFR BLD AUTO: 7 %
ERYTHROCYTE [DISTWIDTH] IN BLOOD BY AUTOMATED COUNT: 15.6 % (ref 11.5–14.5)
EST. AVERAGE GLUCOSE BLD GHB EST-MCNC: 162.8 MG/DL
GLOBULIN SER-MCNC: 6.3 GM/DL (ref 2.4–3.5)
GLUCOSE SERPL-MCNC: 158 MG/DL (ref 74–100)
HBA1C MFR BLD: 7.3 %
HCT VFR BLD AUTO: 29.6 % (ref 35–46)
HGB BLD-MCNC: 9 GM/DL (ref 12–16)
IMM GRANULOCYTES # BLD AUTO: 0.02 10*3/UL
IMM GRANULOCYTES NFR BLD AUTO: 0 %
LYMPHOCYTES # BLD AUTO: 1.2 X10(3)/MCL (ref 0.6–4.6)
LYMPHOCYTES NFR BLD AUTO: 22 %
MCH RBC QN AUTO: 26.9 PG (ref 26–34)
MCHC RBC AUTO-ENTMCNC: 30.4 GM/DL (ref 31–37)
MCV RBC AUTO: 88.4 FL (ref 80–100)
MONOCYTES # BLD AUTO: 0.3 X10(3)/MCL (ref 0.1–1.3)
MONOCYTES NFR BLD AUTO: 6 %
NEUTROPHILS # BLD AUTO: 3.52 X10(3)/MCL (ref 2.1–9.2)
NEUTROPHILS NFR BLD AUTO: 64 %
PLATELET # BLD AUTO: 347 X10(3)/MCL (ref 130–400)
PMV BLD AUTO: 9 FL (ref 7.4–10.4)
POTASSIUM SERPL-SCNC: 3.8 MMOL/L (ref 3.5–5.1)
PREALB SERPL-MCNC: <3 MG/DL (ref 16–38)
PROT SERPL-MCNC: 8.3 GM/DL (ref 6.4–8.3)
RBC # BLD AUTO: 3.35 X10(6)/MCL (ref 4–5.2)
SODIUM SERPL-SCNC: 139 MMOL/L (ref 136–145)
WBC # SPEC AUTO: 5.5 X10(3)/MCL (ref 4.5–11)

## 2020-11-24 ENCOUNTER — HISTORICAL (OUTPATIENT)
Dept: ADMINISTRATIVE | Facility: HOSPITAL | Age: 20
End: 2020-11-24

## 2020-12-21 ENCOUNTER — HISTORICAL (OUTPATIENT)
Dept: ADMINISTRATIVE | Facility: HOSPITAL | Age: 20
End: 2020-12-21

## 2020-12-21 LAB
ABS NEUT (OLG): 2.71 X10(3)/MCL (ref 2.1–9.2)
ALBUMIN SERPL-MCNC: 2.2 GM/DL (ref 3.5–5)
ALBUMIN/GLOB SERPL: 0.4 RATIO (ref 1.1–2)
ALP SERPL-CCNC: 175 UNIT/L (ref 40–150)
ALT SERPL-CCNC: 39 UNIT/L (ref 0–55)
AST SERPL-CCNC: 30 UNIT/L (ref 5–34)
BASOPHILS # BLD AUTO: 0 X10(3)/MCL (ref 0–0.2)
BASOPHILS NFR BLD AUTO: 1 %
BILIRUB SERPL-MCNC: 0.2 MG/DL
BILIRUBIN DIRECT+TOT PNL SERPL-MCNC: 0 MG/DL (ref 0–0.8)
BILIRUBIN DIRECT+TOT PNL SERPL-MCNC: 0.2 MG/DL (ref 0–0.5)
BUN SERPL-MCNC: 23 MG/DL (ref 7–18.7)
CALCIUM SERPL-MCNC: 9.2 MG/DL (ref 8.4–10.2)
CHLORIDE SERPL-SCNC: 100 MMOL/L (ref 98–107)
CO2 SERPL-SCNC: 24 MMOL/L (ref 22–29)
CREAT SERPL-MCNC: 0.65 MG/DL (ref 0.55–1.02)
CRP SERPL HS-MCNC: 6.66 MG/DL
EOSINOPHIL # BLD AUTO: 0.3 X10(3)/MCL (ref 0–0.9)
EOSINOPHIL NFR BLD AUTO: 6 %
ERYTHROCYTE [DISTWIDTH] IN BLOOD BY AUTOMATED COUNT: 14.8 % (ref 11.5–14.5)
GLOBULIN SER-MCNC: 6.1 GM/DL (ref 2.4–3.5)
GLUCOSE SERPL-MCNC: 371 MG/DL (ref 74–100)
HCT VFR BLD AUTO: 32.7 % (ref 35–46)
HGB BLD-MCNC: 10 GM/DL (ref 12–16)
IMM GRANULOCYTES # BLD AUTO: 0.01 10*3/UL
IMM GRANULOCYTES NFR BLD AUTO: 0 %
LYMPHOCYTES # BLD AUTO: 1.2 X10(3)/MCL (ref 0.6–4.6)
LYMPHOCYTES NFR BLD AUTO: 27 %
MCH RBC QN AUTO: 27.2 PG (ref 26–34)
MCHC RBC AUTO-ENTMCNC: 30.6 GM/DL (ref 31–37)
MCV RBC AUTO: 88.9 FL (ref 80–100)
MONOCYTES # BLD AUTO: 0.4 X10(3)/MCL (ref 0.1–1.3)
MONOCYTES NFR BLD AUTO: 8 %
NEUTROPHILS # BLD AUTO: 2.71 X10(3)/MCL (ref 2.1–9.2)
NEUTROPHILS NFR BLD AUTO: 59 %
PLATELET # BLD AUTO: 303 X10(3)/MCL (ref 130–400)
PMV BLD AUTO: 9.3 FL (ref 7.4–10.4)
POTASSIUM SERPL-SCNC: 4.2 MMOL/L (ref 3.5–5.1)
PROT SERPL-MCNC: 8.3 GM/DL (ref 6.4–8.3)
RBC # BLD AUTO: 3.68 X10(6)/MCL (ref 4–5.2)
SODIUM SERPL-SCNC: 135 MMOL/L (ref 136–145)
WBC # SPEC AUTO: 4.6 X10(3)/MCL (ref 4.5–11)

## 2021-01-22 ENCOUNTER — HISTORICAL (OUTPATIENT)
Dept: ADMINISTRATIVE | Facility: HOSPITAL | Age: 21
End: 2021-01-22

## 2021-01-22 LAB
ABS NEUT (OLG): 3.34 X10(3)/MCL (ref 2.1–9.2)
ALBUMIN SERPL-MCNC: 2.1 GM/DL (ref 3.5–5)
BASOPHILS # BLD AUTO: 0 X10(3)/MCL (ref 0–0.2)
BASOPHILS NFR BLD AUTO: 1 %
CRP SERPL HS-MCNC: 13.59 MG/DL
EOSINOPHIL # BLD AUTO: 0.3 X10(3)/MCL (ref 0–0.9)
EOSINOPHIL NFR BLD AUTO: 6 %
ERYTHROCYTE [DISTWIDTH] IN BLOOD BY AUTOMATED COUNT: 13.9 % (ref 11.5–14.5)
HCT VFR BLD AUTO: 33.5 % (ref 35–46)
HGB BLD-MCNC: 10.2 GM/DL (ref 12–16)
IMM GRANULOCYTES # BLD AUTO: 0.01 10*3/UL
IMM GRANULOCYTES NFR BLD AUTO: 0 %
LYMPHOCYTES # BLD AUTO: 1.3 X10(3)/MCL (ref 0.6–4.6)
LYMPHOCYTES NFR BLD AUTO: 25 %
MCH RBC QN AUTO: 26.6 PG (ref 26–34)
MCHC RBC AUTO-ENTMCNC: 30.4 GM/DL (ref 31–37)
MCV RBC AUTO: 87.2 FL (ref 80–100)
MONOCYTES # BLD AUTO: 0.4 X10(3)/MCL (ref 0.1–1.3)
MONOCYTES NFR BLD AUTO: 6 %
NEUTROPHILS # BLD AUTO: 3.34 X10(3)/MCL (ref 2.1–9.2)
NEUTROPHILS NFR BLD AUTO: 62 %
PLATELET # BLD AUTO: 366 X10(3)/MCL (ref 130–400)
PMV BLD AUTO: 9.5 FL (ref 7.4–10.4)
PROT SERPL-MCNC: 8.9 GM/DL (ref 6.4–8.3)
RBC # BLD AUTO: 3.84 X10(6)/MCL (ref 4–5.2)
WBC # SPEC AUTO: 5.4 X10(3)/MCL (ref 4.5–11)

## 2021-03-24 ENCOUNTER — HISTORICAL (OUTPATIENT)
Dept: ADMINISTRATIVE | Facility: HOSPITAL | Age: 21
End: 2021-03-24

## 2021-03-24 LAB
ABS NEUT (OLG): 3.78 X10(3)/MCL (ref 2.1–9.2)
ALBUMIN SERPL-MCNC: 2 GM/DL (ref 3.5–5)
ALBUMIN/GLOB SERPL: 0.3 RATIO (ref 1.1–2)
ALP SERPL-CCNC: 198 UNIT/L (ref 40–150)
ALT SERPL-CCNC: 30 UNIT/L (ref 0–55)
AST SERPL-CCNC: 16 UNIT/L (ref 5–34)
BASOPHILS # BLD AUTO: 0 X10(3)/MCL (ref 0–0.2)
BASOPHILS NFR BLD AUTO: 0 %
BILIRUB SERPL-MCNC: 0.2 MG/DL
BILIRUBIN DIRECT+TOT PNL SERPL-MCNC: 0 MG/DL (ref 0–0.8)
BILIRUBIN DIRECT+TOT PNL SERPL-MCNC: 0.2 MG/DL (ref 0–0.5)
BUN SERPL-MCNC: 14.8 MG/DL (ref 7–18.7)
CALCIUM SERPL-MCNC: 9.1 MG/DL (ref 8.4–10.2)
CHLORIDE SERPL-SCNC: 97 MMOL/L (ref 98–107)
CK SERPL-CCNC: 41 U/L (ref 29–168)
CO2 SERPL-SCNC: 27 MMOL/L (ref 22–29)
CREAT SERPL-MCNC: 0.74 MG/DL (ref 0.55–1.02)
CRP SERPL HS-MCNC: 9.5 MG/DL
DEPRECATED CALCIDIOL+CALCIFEROL SERPL-MC: 28.2 NG/ML (ref 30–80)
EOSINOPHIL # BLD AUTO: 0.3 X10(3)/MCL (ref 0–0.9)
EOSINOPHIL NFR BLD AUTO: 5 %
ERYTHROCYTE [DISTWIDTH] IN BLOOD BY AUTOMATED COUNT: 15.5 % (ref 11.5–14.5)
EST. AVERAGE GLUCOSE BLD GHB EST-MCNC: 185.8 MG/DL
FERRITIN SERPL-MCNC: 627.1 NG/ML (ref 4.63–204)
GLOBULIN SER-MCNC: 6.5 GM/DL (ref 2.4–3.5)
GLUCOSE SERPL-MCNC: 345 MG/DL (ref 74–100)
HBA1C MFR BLD: 8.1 %
HBV SURFACE AG SERPL QL IA: NONREACTIVE
HCT VFR BLD AUTO: 31.6 % (ref 35–46)
HCV AB SERPL QL IA: NONREACTIVE
HGB BLD-MCNC: 9.5 GM/DL (ref 12–16)
IMM GRANULOCYTES # BLD AUTO: 0.02 10*3/UL
IMM GRANULOCYTES NFR BLD AUTO: 0 %
LYMPHOCYTES # BLD AUTO: 1.2 X10(3)/MCL (ref 0.6–4.6)
LYMPHOCYTES NFR BLD AUTO: 21 %
MCH RBC QN AUTO: 26.7 PG (ref 26–34)
MCHC RBC AUTO-ENTMCNC: 30.1 GM/DL (ref 31–37)
MCV RBC AUTO: 88.8 FL (ref 80–100)
MONOCYTES # BLD AUTO: 0.4 X10(3)/MCL (ref 0.1–1.3)
MONOCYTES NFR BLD AUTO: 7 %
NEUTROPHILS # BLD AUTO: 3.78 X10(3)/MCL (ref 2.1–9.2)
NEUTROPHILS NFR BLD AUTO: 66 %
PLATELET # BLD AUTO: 340 X10(3)/MCL (ref 130–400)
PMV BLD AUTO: 9.8 FL (ref 7.4–10.4)
POTASSIUM SERPL-SCNC: 4.1 MMOL/L (ref 3.5–5.1)
PROT SERPL-MCNC: 8.5 GM/DL (ref 6.4–8.3)
RBC # BLD AUTO: 3.56 X10(6)/MCL (ref 4–5.2)
SODIUM SERPL-SCNC: 133 MMOL/L (ref 136–145)
T4 FREE SERPL-MCNC: 1.28 NG/DL (ref 0.7–1.48)
TSH SERPL-ACNC: 0.84 UIU/ML (ref 0.35–4.94)
WBC # SPEC AUTO: 5.7 X10(3)/MCL (ref 4.5–11)

## 2021-07-06 ENCOUNTER — HISTORICAL (OUTPATIENT)
Dept: ADMINISTRATIVE | Facility: HOSPITAL | Age: 21
End: 2021-07-06

## 2021-07-06 LAB
ABS NEUT (OLG): 3.57 X10(3)/MCL (ref 2.1–9.2)
ALBUMIN SERPL-MCNC: 2.4 GM/DL (ref 3.5–5)
ALBUMIN/GLOB SERPL: 0.4 RATIO (ref 1.1–2)
ALP SERPL-CCNC: 172 UNIT/L (ref 40–150)
ALT SERPL-CCNC: 92 UNIT/L (ref 0–55)
AST SERPL-CCNC: 44 UNIT/L (ref 5–34)
BASOPHILS # BLD AUTO: 0 X10(3)/MCL (ref 0–0.2)
BASOPHILS NFR BLD AUTO: 1 %
BILIRUB SERPL-MCNC: 0.4 MG/DL
BILIRUBIN DIRECT+TOT PNL SERPL-MCNC: 0.2 MG/DL (ref 0–0.5)
BILIRUBIN DIRECT+TOT PNL SERPL-MCNC: 0.2 MG/DL (ref 0–0.8)
BUN SERPL-MCNC: 28.2 MG/DL (ref 7–18.7)
CALCIUM SERPL-MCNC: 10.2 MG/DL (ref 8.4–10.2)
CHLORIDE SERPL-SCNC: 101 MMOL/L (ref 98–107)
CO2 SERPL-SCNC: 29 MMOL/L (ref 22–29)
CREAT SERPL-MCNC: 0.62 MG/DL (ref 0.55–1.02)
CRP SERPL-MCNC: 12.77 MG/DL
DEPRECATED CALCIDIOL+CALCIFEROL SERPL-MC: 33.3 NG/ML (ref 30–80)
EOSINOPHIL # BLD AUTO: 0.5 X10(3)/MCL (ref 0–0.9)
EOSINOPHIL NFR BLD AUTO: 8 %
ERYTHROCYTE [DISTWIDTH] IN BLOOD BY AUTOMATED COUNT: 14.2 % (ref 11.5–14.5)
EST. AVERAGE GLUCOSE BLD GHB EST-MCNC: 171.4 MG/DL
GLOBULIN SER-MCNC: 6.6 GM/DL (ref 2.4–3.5)
GLUCOSE SERPL-MCNC: 95 MG/DL (ref 74–100)
HBA1C MFR BLD: 7.6 %
HCT VFR BLD AUTO: 35.6 % (ref 35–46)
HGB BLD-MCNC: 11.2 GM/DL (ref 12–16)
IMM GRANULOCYTES # BLD AUTO: 0.02 10*3/UL
IMM GRANULOCYTES NFR BLD AUTO: 0 %
LYMPHOCYTES # BLD AUTO: 1.6 X10(3)/MCL (ref 0.6–4.6)
LYMPHOCYTES NFR BLD AUTO: 26 %
MCH RBC QN AUTO: 27.9 PG (ref 26–34)
MCHC RBC AUTO-ENTMCNC: 31.5 GM/DL (ref 31–37)
MCV RBC AUTO: 88.6 FL (ref 80–100)
MONOCYTES # BLD AUTO: 0.4 X10(3)/MCL (ref 0.1–1.3)
MONOCYTES NFR BLD AUTO: 7 %
NEUTROPHILS # BLD AUTO: 3.57 X10(3)/MCL (ref 2.1–9.2)
NEUTROPHILS NFR BLD AUTO: 57 %
NRBC BLD AUTO-RTO: 0 % (ref 0–0.2)
PLATELET # BLD AUTO: 341 X10(3)/MCL (ref 130–400)
PMV BLD AUTO: 9.6 FL (ref 7.4–10.4)
POTASSIUM SERPL-SCNC: 4.4 MMOL/L (ref 3.5–5.1)
PREALB SERPL-MCNC: 20 MG/DL (ref 16–38)
PROT SERPL-MCNC: 9 GM/DL (ref 6.4–8.3)
RBC # BLD AUTO: 4.02 X10(6)/MCL (ref 4–5.2)
SODIUM SERPL-SCNC: 140 MMOL/L (ref 136–145)
WBC # SPEC AUTO: 6.2 X10(3)/MCL (ref 4.5–11)

## 2021-10-05 ENCOUNTER — HISTORICAL (OUTPATIENT)
Dept: ADMINISTRATIVE | Facility: HOSPITAL | Age: 21
End: 2021-10-05

## 2021-10-05 LAB
ABS NEUT (OLG): 3.04 X10(3)/MCL (ref 2.1–9.2)
ALBUMIN SERPL-MCNC: 2.2 GM/DL (ref 3.5–5)
ALBUMIN/GLOB SERPL: 0.3 RATIO (ref 1.1–2)
ALP SERPL-CCNC: 130 UNIT/L (ref 40–150)
ALT SERPL-CCNC: 30 UNIT/L (ref 0–55)
AST SERPL-CCNC: 25 UNIT/L (ref 5–34)
BASOPHILS # BLD AUTO: 0 X10(3)/MCL (ref 0–0.2)
BASOPHILS NFR BLD AUTO: 1 %
BILIRUB SERPL-MCNC: 0.7 MG/DL
BILIRUBIN DIRECT+TOT PNL SERPL-MCNC: 0.3 MG/DL (ref 0–0.8)
BILIRUBIN DIRECT+TOT PNL SERPL-MCNC: 0.4 MG/DL (ref 0–0.5)
BUN SERPL-MCNC: 17.9 MG/DL (ref 7–18.7)
CALCIUM SERPL-MCNC: 10.7 MG/DL (ref 8.4–10.2)
CHLORIDE SERPL-SCNC: 100 MMOL/L (ref 98–107)
CO2 SERPL-SCNC: 28 MMOL/L (ref 22–29)
CREAT SERPL-MCNC: 0.62 MG/DL (ref 0.55–1.02)
EOSINOPHIL # BLD AUTO: 0.3 X10(3)/MCL (ref 0–0.9)
EOSINOPHIL NFR BLD AUTO: 5 %
ERYTHROCYTE [DISTWIDTH] IN BLOOD BY AUTOMATED COUNT: 14.9 % (ref 11.5–14.5)
EST. AVERAGE GLUCOSE BLD GHB EST-MCNC: 108.3 MG/DL
FERRITIN SERPL-MCNC: 555.67 NG/ML (ref 4.63–204)
GLOBULIN SER-MCNC: 6.4 GM/DL (ref 2.4–3.5)
GLUCOSE SERPL-MCNC: 154 MG/DL (ref 74–100)
HBA1C MFR BLD: 5.4 %
HCT VFR BLD AUTO: 33 % (ref 35–46)
HGB BLD-MCNC: 10.3 GM/DL (ref 12–16)
IMM GRANULOCYTES # BLD AUTO: 0.02 10*3/UL
IMM GRANULOCYTES NFR BLD AUTO: 0 %
LYMPHOCYTES # BLD AUTO: 1.2 X10(3)/MCL (ref 0.6–4.6)
LYMPHOCYTES NFR BLD AUTO: 25 %
MCH RBC QN AUTO: 27.1 PG (ref 26–34)
MCHC RBC AUTO-ENTMCNC: 31.2 GM/DL (ref 31–37)
MCV RBC AUTO: 86.8 FL (ref 80–100)
MONOCYTES # BLD AUTO: 0.3 X10(3)/MCL (ref 0.1–1.3)
MONOCYTES NFR BLD AUTO: 6 %
NEUTROPHILS # BLD AUTO: 3.04 X10(3)/MCL (ref 2.1–9.2)
NEUTROPHILS NFR BLD AUTO: 63 %
NRBC BLD AUTO-RTO: 0 % (ref 0–0.2)
PLATELET # BLD AUTO: 310 X10(3)/MCL (ref 130–400)
PMV BLD AUTO: 9 FL (ref 7.4–10.4)
POTASSIUM SERPL-SCNC: 4.1 MMOL/L (ref 3.5–5.1)
PROT SERPL-MCNC: 8.6 GM/DL (ref 6.4–8.3)
RBC # BLD AUTO: 3.8 X10(6)/MCL (ref 4–5.2)
SODIUM SERPL-SCNC: 139 MMOL/L (ref 136–145)
WBC # SPEC AUTO: 4.9 X10(3)/MCL (ref 4.5–11)

## 2022-04-11 ENCOUNTER — HISTORICAL (OUTPATIENT)
Dept: ADMINISTRATIVE | Facility: HOSPITAL | Age: 22
End: 2022-04-11
Payer: MEDICAID

## 2022-04-27 VITALS
DIASTOLIC BLOOD PRESSURE: 70 MMHG | SYSTOLIC BLOOD PRESSURE: 111 MMHG | BODY MASS INDEX: 12.95 KG/M2 | OXYGEN SATURATION: 96 % | WEIGHT: 65.94 LBS | HEIGHT: 60 IN

## 2022-04-30 NOTE — H&P
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  During clinic visit patient noted to be tachycardic and tachypneic, however was afebrile.  Also noted to have 3lb weight loss from previous visit 2 weeks prior.  Patient states that she feels fine and denies any SOB, chest pain, palpitations, nausea/vomiting, or change in bowel habits.  She does note that she becomes full faster while eating, only able to eat half her food typically.  She has also had a weight loss of approx 20lbs since 3/2018.  She has had an extensive evaluation at [facility] via their CF clinic.  Her diagnostic considerations included rheumatoid arthritis or inflammatory bowel disease with joint manifestations. She has had upper and lower GI endoscopies and biopsies which were negative. Her rheumatologic interventions thus far have included prednisone, Plaquenil and NSAIDS. Biologicals have not been included to date.  Was admitted at [facility] on 18 for malnutrition with weight gain and improvement in nutrition status noted while inpatient in hospital and was to follow up with GI as outpatient.  IM consulted for admission for severe malnutrition and for GI consult to consider PEG placement.  CXR showed no acute processes.  EKG was sinus tachycardia with no ST-T wave changes.  Labs were pending at time of evaluation.  Today's info:  Patient received PEG tube yesterday without issue.  Started on clears yesterday evening and tolerating. Will try full diet  today.  Ambulating and using IS.  Denies fever/chills, nausea/vomiting, diarrhea, constipation, chest pain, or shortness of breath.           Review of Systems   ROS reviewed as documented in chart      Health Status   Allergies:    Allergic Reactions (Selected)  No Known Allergies,    Allergies (1) Active Reaction  No Known Allergies None Documented   Current medications:    Medications (34) Active  Scheduled: (17)  azithromycin 250 mg tablet  500 mg 2 tab(s), Oral, BID MWF  clindamycin  600 mg 50 mL, IV Piggyback, [id number] (6-12-6-12)  Creon 24,000 units oral delayed release capsule  5 caps, Oral, TID  cyproheptadine 4 mg Tab UD  2 mg 0.5 tab(s), Oral, BID  doxycycline hyclate 100 mg Cap UD  100 mg 1 cap(s), Oral, BID  enoxaparin 40mg/0.4ml Inj  40 mg 0.4 mL, Subcutaneous, BID  folic acid 1 mg Tab UD  1 mg 1 tab(s), Oral, Daily  hydroxychloroquine 200 mg Tab UD  200 mg 1 tab(s), Oral, BID  insulin (LanTUS) glargine 100 u/ml Sol PEN  20 units 0.2 mL, Subcutaneous, BID  megestrol 40 mg Tab UD  20 mg 0.5 tab(s), Oral, Daily  montelukast 5 mg Chew Tab  10 mg 2 tab(s), Oral, Daily  Nasal Saline 0.65% solution  2 drop(s), Nasal, q2hr  pantoprazole 40 mg EC Tab  40 mg 1 tab(s), Oral, Daily  prednisone 10 mg Tab UD  10 mg 1 tab(s), Oral, Daily  Pulmozyme 2.5 mg/2.5 mL inhalation solution  2.5mg, 2.5ml, NEB, Daily  sodium chloride 7% INHAL Soln UD  4 mL, INH, BID  Tobramycin Inhalation solution 300 mg/4ml  300 mg 4 mL, NEB, q12hr  Continuous: (4)  lactated ringers 1,000 mL  1,000 mL, IV, 75 mL/hr  lactated ringers 750 mL  750 mL, IV, 750 mL/hr  sodium chloride 0.9% 1,000 mL  1,000 mL, IV, 10 mL/hr  sodium chloride 0.9% 1,000 mL  1,000 mL, IV, 75 mL/hr  PRN: (13)  acetaminophen 325 mg Tab  650 mg 2 tab(s), Oral, q6hr  albuterol 0.083% Sol 3 mL UD  2.5 mg 3 mL, NEB, q6hr  dextrose 50% abboj  12.5 gm 25 mL, IV Push, As Directed  dextrose 50% abboj  12.5 gm 25 mL, IV Push, Once  dextrose 50% abboj  12.5 gm 25 mL, IV Push, As  Directed  dextrose 50% abboj  25 gm 50 mL, IV Push, As Directed  glucagon recombinant 1 mg Inj  1 mg 1 EA, IM, q10min  glucagon recombinant 1 mg Inj  1 mg 1 EA, IM, q10min  glucose 40% oral gel  15 gm 0.5 tube(s), Oral, As Directed  ibuprofen 400 mg Tab UD  400 mg 1 tab(s), Oral, q6hr  insulin (Humalog) lispro 100 u/ml Inj  2-14 units, Subcutaneous, As Directed  ondansetron 2 mg/mL inj - 2mL  4 mg 2 mL, IV Push, q4hr  ondansetron 2 mg/mL inj - 2mL  8 mg 4 mL, IV Push, q4hr   Problem list:    All Problems  Allergic rhinitis / SNOMED CT 798475481 / Confirmed  Anemia of chronic disease / SNOMED CT 751814479 / Confirmed  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 / SNOMED CT 854618751 / Confirmed  Chronic disease-related malnutrition / SNOMED CT 457932637327680 / Confirmed  CF (cystic fibrosis) / SNOMED CT 113676103 / Confirmed  CYSTIC FIBROSIS WITH GASTROINTESTINAL MANIFESTATIONS / ICD-9-.03 / Confirmed  Cystic fibrosis with pulmonary exacerbation / ICD-9-.02 / Confirmed  Diabetes mellitus related to cystic fibrosis / SNOMED CT 2240700064 / Confirmed  Diabetes mellitus related to cystic fibrosis / ICD-9-.00 / Confirmed  Encounter for adjustment or management of vascular access device / SNOMED CT 303356010 / Confirmed  Hypoalbuminemia / SNOMED CT 265077201 / Confirmed  Impaired mobility / SNOMED CT 882844538 / Confirmed / Stable  Severe malnutrition / SNOMED CT 25990453 / Confirmed  Polyarticular arthritis / SNOMED CT 55221205 / Confirmed  Primary amenorrhea / SNOMED CT 51021721 / Confirmed  Pseudomonas infection / ICD-9-.7 / Confirmed  Vomiting / SNOMED CT 5318365110 / Confirmed  Weight loss / SNOMED CT 300018173 / Confirmed  Inactive: Impaired mobility / SNOMED CT 402648873  Resolved: Alteration in tissue perfusion / SNOMED CT 4937429884  Problem automatically updated based on documentation on Capillary Refills, Brachial Pulses, Radial Pulses, Femoral Pulses, Dorsalis Pulses, Ulnar pulses,  Popliteal Pulses, Postibial Pulses, Edemas, Affect/Behavior, Orientation Assessment, Arterial Line Site.  Canceled: Alteration in nutrition / SNOMED CT 923703526  Problem automatically updated based on documentation on Nutritional Risk Factors or  Unintentional Weight Loss.  Canceled: At risk for falls / SNOMED CT 374999773  Problem automatically updated based on documentation on History of Falls, History of Falls in last 3 months Nieto, Nieto Fall Risk Score and/or ADLs.  Canceled: At risk for infection / SNOMED CT 319889853  Problem automatically updated based on documentation on WBC and/or Neutro Auto.  Canceled: At risk for nutritional problem / SNOMED CT 396573134  Problem automatically updated based on documentation on Nutritional Risk Factors or  Unintentional Weight Loss.  Canceled: At risk of healthcare associated infection / SNOMED CT 9070600126  Problem automatically updated based on Central IV Access Type, Arterial Line Site, PA Catheter Type, Pacemaker Type.  Canceled: Ineffective breathing pattern / SNOMED CT 61611386  Problem automatically updated based on documentation on Oxygen Therapy, Respiratory Pattern, or Respiratory Pattern ICU.  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   CF (cystic fibrosis)   Chronic disease-related malnutrition   CYSTIC FIBROSIS WITH GASTROINTESTINAL MANIFESTATIONS   Cystic fibrosis with pulmonary exacerbation   Diabetes mellitus related to cystic fibrosis   Diabetes mellitus related to cystic fibrosis   Encounter for adjustment or management of vascular access device   Hypoalbuminemia   Impaired mobility   Polyarticular arthritis   Primary amenorrhea   Pseudomonas infection   Severe malnutrition   Vomiting   Weight loss       Physical Examination      Vital Signs (last 24 hrs)_____  Last Charted___________  Temp Oral     36.3 DegC  (NOV 06  04:00)  Heart Rate Peripheral   69 bpm  (NOV 06 04:00)  Resp Rate         16 br/min  (NOV 06 04:00)  SBP      93 mmHg  (NOV 06 04:00)  DBP      61 mmHg  (NOV 06 04:00)  SpO2      97 %  (NOV 06 04:00)  Weight      39.4 kg  (NOV 06 06:00)   General:  Alert and oriented, No acute distress, cachectic.    Eye:  Extraocular movements are intact.    Respiratory:  Breath sounds are equal, No chest wall tenderness, bilateral lower lobe crackles.    Cardiovascular:  Normal rate, Regular rhythm, No murmur, Good pulses equal in all extremities.    Gastrointestinal:  Soft, Non-tender, Non-distended, Normal bowel sounds, PEG tube in place with dressing intact, no evidence of bleeding or drainage  Musculoskeletal:  No tenderness, No swelling.    Neurologic:  No focal deficits, Cranial Nerves II-XII are grossly intact.    Cognition and Speech:  Speech clear and coherent.    Psychiatric:  Appropriate mood & affect.           Review / Management   Results review:  All Results   11/6/2018 5:35 CST       POC CBG                   71 mg/dL    11/6/2018 3:50 CST       WBC                       9.8 x10(3)/mcL                             RBC                       4.13 x10(6)/mcL                             Hgb                       11.9 gm/dL  LOW                             Hct                       38.2 %                             Platelet                  323 x10(3)/mcL                             MCV                       92.5 fL                             MCH                       28.8 pg                             MCHC                      31.2 gm/dL                             RDW                       14.9 %  HI                             MPV                       8.4 fL                             Abs Neut                  5.99 x10(3)/mcL                             Neutro Auto               61 x10(3)/mcL  NA                             Lymph Auto                30 %                             Mono Auto                 5 %                              Eos Auto                  2  NA                             Abs Eos                   0.21  NA                             Basophil Auto             0  NA                             Abs Neutro                5.99 x10(3)/mcL  NA                             Abs Lymph                 2.94 x10(3)/mcL  NA                             Abs Mono                  0.46 x10(3)/mcL  NA                             Abs Baso                  0.05 x10(3)/mcL  NA                             IG%                       2 %  NA                             IG#                       0.2000  NA                             Sodium Lvl                136 mmol/L                             Potassium Lvl             4.3 mmol/L                             Chloride                  101 mmol/L                             CO2                       26 mmol/L                             Calcium Lvl               8.8 mg/dL                             Glucose Lvl               65 mg/dL  LOW                             BUN                       13 mg/dL                             Creatinine                0.40 mg/dL  LOW                             eGFR-AA                   >105 mL/min                             eGFR-LIZETTE                  >105 mL/min                             Bili Total                0.3 mg/dL                             Bili Direct               <0.1 mg/dL                             Bili Indirect             calc not valid mg/dL                             AST                       14 unit/L  LOW                             ALT                       14 unit/L                             Alk Phos                  66 unit/L                             Total Protein             8.2 gm/dL                             Albumin Lvl               1.8 gm/dL  LOW                             Globulin                  6.40 gm/mL  HI                             A/G Ratio                 0 ratio  LOW  .    Laboratory  Results   Radiology results   Rad Results Last 72 Hrs          Impression and Plan   1. SIRS 3/4  - WBC of 20.3 at admit, now wnl at 9.8  - lactic acid 0.9  - patient has been afebrile, but persistent cough  - CXR - with no acute processes appreciated  - 11/1 CT Thorax without evidence of PE, likely infectious or inflammatory change of left lung base  - continue IV Clindamycin with doxycycline 100mg BID started 10/4 for empiric coverage at this time - will consider de-escalation with culture results  - Blood Cx x2 (one taken from [facility]) and urine cx ordered - no growth  - Resp Cx with GNR and MRSA; blood cx negative    2. Severe Malnutrition w/ weight loss   - PEG placed 11/5 by GI  - will need extensive education on PEG feedings especially in setting of CF with enzyme replacement as feeds cannot be continuous  - consult to Nutrition for recs to increase protein  - will attempt to set up home health for patient for PEG feeding and maintenance  - will start oral diet and PEG feedings today  - Albumin level: 1.8 gm/dL  - given megace and ensure  - IV NS at 75ml/hr   - continue PT     3. Cystic Fibrosis  - continue Home medications including pancreatic enzyme replacement (Creon 85821 units daily)  - monitor O2 sats and respiratory status  - Duonebs q 6hrs PRN   - Pulmozyme 2.5mg/2.5ml NEB daily  - cont all other home medications    4. 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 Polyarthralgia   - Ibuprofen 400mg qhrs prn for pain  - continue Plaquenil 200mg bid   - Prednisone 10mg daily  - Rheumatology following, appreciate recs    6. Anemia  - likely secondary to chronic disease  - H/H decreased 9.2/29.2->7.7/25.3, now s/p 2 units pRBCs with H/H increase to 11.5/35.5, stable  - iron low, ferritin elevated    7. PE, negative  - CT angio abdomen with concern of left lower lobe PE; follow-up  CT Thorax without evidence of PE  - discontinue full dose lovenox  - encourage ambulation      Dispo: Admitted to telemetry for close monitoring.  IV Clindamycin being given and started on doxycycline 100mg BID, patient meeting 3/4 sirs criteria now with GNR and MRSA on Resp Culture. S/p PEG placement 11/5, will resume full diet today. Blood and urine cx no growth.  Rheumatology and GI following.  Patient will need extensive education about PEG feeding and maintenance prior to discharge.

## 2022-04-30 NOTE — H&P
Patient:   Abhay Luna            MRN: [id number]            FIN: [id number]               Age:   18 years     Sex:  Female     :  2000   Associated Diagnoses:   None   Author:   Berry RINCON, Jack PAYNE      Basic Information   Source of history:  Self, Mother.    Present at bedside:  Mother.    Referral source:  Jahaira RINCON, Chucho LAKHANI    History limitation:  None.       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  During clinic visit patient noted to be tachycardic and tachypneic, however was afebrile.  Also noted to have 3lb weight loss from previous visit 2 weeks prior.  Patient states that she feels fine and denies any SOB, chest pain, palpitations, nausea/vomiting, or change in bowel habits.  She does note that she becomes full faster while eating, only able to eat half her food typically.  She has also had a weight loss of approx 20lbs since 3/2018.  She has had an extensive evaluation at [facility] via their CF clinic.  Her diagnostic considerations included rheumatoid arthritis or inflammatory bowel disease with joint manifestations. She has had upper and lower GI endoscopies and biopsies which were negative. Her rheumatologic interventions thus far have included prednisone, Plaquenil and NSAIDS. Biologicals have not been included to date.  Was admitted at [facility] on 18 for malnutrition with weight gain and improvement in nutrition status noted while inpatient in hospital and was to follow up with GI as outpatient.  IM consulted for admission for severe malnutrition and for GI consult to consider PEG placement.  CXR showed no acute processes.   EKG was sinus tachycardia with no ST-T wave changes.  Labs were pending at time of evaluation.    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 Lives with mother. In 12th grade.           Review of Systems   Constitutional:  weight loss, poor appetite, No fever, No chills, No weakness.    Eye:  No blurring, No double vision.    Ear/Nose/Mouth/Throat:  No nasal congestion, No sore throat.    Respiratory:  Cough, No shortness of breath, No wheezing.    Cardiovascular:  No chest pain, No palpitations.    Gastrointestinal:  No nausea, No vomiting, No diarrhea, No constipation, No abdominal pain.    Genitourinary:  No dysuria, No hematuria.    Musculoskeletal:  Joint pain, Muscle pain.    Integumentary:  No rash, No pruritus.       Health Status   Current medications: Prescriptions  Prescribed  Accu-Chek AvivaPlus test strips: Accu-Chek AvivaPlus test strips, See Instructions, E13.69  test blood sugar 2-3 times per day and as needed, # 100 EA, 5 Refill(s), Pharmacy: [facility]  AquADEKs oral tablet, chewable: 1 tab(s), Oral, Daily, # 30 tab(s), 6 Refill(s), Pharmacy: [facility]  Bethkis 75 mg/mL inhalation solution: 300 mg = 4 mL, NEB, BID, # 56 EA, 5 Refill(s), Pharmacy: [facility]  Creon 24,000 units oral delayed release capsule: See Instructions, 5 cap(s) Oral TID 30 day(s) with meals  3 caps with snacks, # 630 cap(s), 5 Refill(s), Pharmacy: [facility]  Hyper-Sal 7% inhalation solution: 1 neb, INH, BID, Do not mix with Pulmozyne., # 60 amp, 6 Refill(s), Pharmacy: [facility]  Mediport flush: Mediport flush, See Instructions, access and flush mediport with 5 ml of heparin, 100 units per  ml  Q 4 wees, # 1 EA, 11 Refill(s)  Nasal Saline 0.65% solution: 2 drop(s), Nasal, q2hr, # 1 box(es), 11 Refill(s), Pharmacy: [facility]  One Touch Delica 31 guage lancets: One Touch Delica 31 guage lancets, See Instructions, check pblood sugar twice a day, # 100 EA, 5 Refill(s), Pharmacy: [facility]  Plaquenil 200 mg oral tablet: 200 mg = 1 tab(s), Oral, BID, with food or milk, # 60 tab(s), 5 Refill(s), Pharmacy: [facility]  ProAir HFA 90 mcg/inh inhalation aerosol with adapter: 2 puff(s), INH, BID, 2 puffs BID prior to Hypersal treatment and q4h PRN cough, wheezing, # 1 EA, 6 Refill(s), Pharmacy: [facility]  Pulmozyme 2.5 mg/2.5 mL inhalation solution: 2.5 mg = 2.5 mL, NEB, Daily, Do not mix with Hyper-Sal., # 1 box(es), 6 Refill(s), Pharmacy: [facility]  Toujeo SoloStar 300 units/mL subcutaneous solution: 14 units, Subcutaneous, Daily, # 4.5 mL, 5 Refill(s), Pharmacy: [facility]  azithromycin 500 mg oral tablet: See Instructions, Take 1 tab three times per week (Monday, Wednesday, Friday)  Dx: E84, # 36 tab(s), 1 Refill(s), Pharmacy: [facility]  cyproheptadine 4 mg oral tablet: 2 mg = 0.5 tab(s), Oral, BID, Take half a tablet by mouth twice daily (before breakfast and dinner)., # 30 tab(s), 5 Refill(s), Pharmacy: [facility]  montelukast 10 mg oral TABLET: 10 mg = 1 tab(s), Oral, Daily, # 30 tab(s), 5 Refill(s), Pharmacy: [facility]  naproxen 375 mg oral delayed release tablet: 375 mg = 1 tab(s), Oral, BID, with food  do not chew or break tablets, # 60 tab(s), 3 Refill(s), Pharmacy: [facility]  prednisONE 10 mg oral tablet: 20 mg = 2 tab(s), Oral, Daily, # 60 tab(s), 1 Refill(s), Pharmacy: [facility]  sure comfort 31G Pen Needles: sure comfort 31G Pen Needles, See Instructions, use as directed for insulin injectionis, # 100 EA, 5 Refill(s), Pharmacy: [facility]        Physical Examination   Vital Signs   10/29/2018  13:13 CDT     Temperature Temporal Artery               37.2 DegC                             Apical Heart Rate         174 bpm  HI                             Peripheral Pulse Rate     174 bpm  HI                             Respiratory Rate          38 br/min  HI                             Systolic Blood Pressure   94 mmHg                             Diastolic Blood Pressure  58 mmHg  LOW     General:  Alert and oriented, No acute distress, severely malnourished cachectic female.    Eye:  Pupils are equal, round and reactive to light, Extraocular movements are intact.    HENT:  Oral mucosa is moist, No pharyngeal erythema.    Neck:  Supple, Non-tender, No lymphadenopathy, No thyromegaly.    Respiratory:  Lungs are clear to auscultation, Respirations are non-labored, Breath sounds are equal, Symmetrical chest wall expansion.    Cardiovascular:  Regular rhythm, No murmur, No edema, tachycardic.    Gastrointestinal:  Soft, Non-tender, Non-distended.    Genitourinary:  No costovertebral angle tenderness.    Musculoskeletal:  Normal range of motion, gneralized weakness in all extremities.  Bilateral knees with mild effusion, but no tenderness or decrease in ROM.    Integumentary:  Warm, Dry, Intact.    Neurologic:  No focal deficits, Cranial Nerves II-XII are grossly intact.    Cognition and Speech:  Speech clear and coherent.    Psychiatric:  Cooperative.       Review / Management   Laboratory Results   Today's Lab Results : PowerNote Discrete Results   10/29/2018 15:00 CDT     WBC                       20.3 x10(3)/mcL  HI                             RBC                       3.32 x10(6)/mcL  LOW                             Hgb                       9.2 gm/dL  LOW                             Hct                       29.2 %  LOW                             Platelet                  296 x10(3)/mcL                             MCV                       88.0 fL                             MCH                       27.7  pg                             MCHC                      31.5 gm/dL                             RDW                       15.6 %  HI                             MPV                       8.9 fL                             Abs Neut                  19.23 x10(3)/mcL  HI                             Segs Man                  94 %  HI                             Band Man                  4 %                             Lymph Man                 2.0 %  LOW                             Monocyte Man              0 %  LOW                             Eos Man                   0 %                             Basophil Man              0 %                             Platelet Est              Adequate                             Anisocyte                 1+                             Polychrom                 1+                             RBC Morph                 Abnormal                             Sodium Lvl                132 mmol/L  LOW                             Potassium Lvl             3.9 mmol/L                             Chloride                  95 mmol/L  LOW                             CO2                       32 mmol/L                             Calcium Lvl               8.4 mg/dL  LOW                             Glucose Lvl               362 mg/dL  HI                             EAG                       212 mg/dL  HI                             BUN                       10 mg/dL                             Creatinine                0.60 mg/dL                             eGFR-AA                   >105 mL/min                             eGFR-LIZETTE                  >105 mL/min                             Bili Total                0.4 mg/dL                             Bili Direct               0.2 mg/dL                             Bili Indirect             0.2 mg/dL                             AST                       12 unit/L  LOW                             ALT                       7 unit/L  LOW                              Alk Phos                  97 unit/L                             Total Protein             8.4 gm/dL  HI                             Albumin Lvl               1.6 gm/dL  LOW                             Globulin                  6.80 gm/mL  HI                             A/G Ratio                 0 ratio  LOW                             NT pro BNP.               133 pg/mL  HI                             Hgb A1c                   9.0 %  HI                             Prealbumin                8.2 mg/dL  LOW        Radiology results   X-ray, Radiology Report  XR Chest 2 Views     REASON FOR STUDY: cystic fibrosis;Other (please specify)     TECHNIQUE: Frontal and lateral radiographs of the chest     COMPARISON: 9/7/2018     FINDINGS:     Right-sided medication effusion port is unchanged in position.     The cardiomediastinal silhouette and pulmonary vasculature are normal.     Cystic changes and scarring the lungs are identified greater on the  right than left. No effusion. Hyperinflation identified. The overall  appearance is not significantly changed compared to prior examination.     IMPRESSION:     No significant interval change.        Impression and Plan   1. SIRS 3/4 - with no obvious source of infection, could be related to her nutritional status  -WBC of 20.3 - chronic steroid use could be contributing  -lactic acid 0.9  -patient has been afebrile, but persistent cough  -CXR - with no acute processes appreciated  -will start IV Vancomycin and Zosyn for empiric coverage at this time - will consider de-escalation with culture results  -Blood Cx x2 (one taken from [facility]) and urine cx ordered - will follow    2. Severe Malnutrition w/ weight loss   -consult to Nutrition for recs, will consider TPN  -Albumin level: 1.6 gm/dL  -consult GI for possible PEG tube placement  -IV NS at 80ml/hr (maintenance) after bolus of 1L  -consult to PT     3. Cystic Fibrosis  -continue Home medications including  pancreatic enzyme replacement (Creon 38549 units daily)  -will obtain blood, respiratory, and urine cultures  -monitor O2 sats and respiratory status  -Duonebs q 6hrs PRN   -Pulmozyme 2.5mg/2.5ml NEB daily  -cont all other home medications    4. 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 Polyarthralgia   -Ibuprofen 400mg qhrs prn for pain  -continue Plaquenil 200mg bid   -continue Prednisone 20mg daily  -will discuss case with Rheumatologist concerning possible RA and futher recommendations    6. Anemia  -likely secondary to chronic disease  -H/H remains stable at this time  -will cont to monitor        Dispo: Admitted to telemetry for close monitoring.  IV Vanc and Zosyn started for broad coverage, patient meeting 3/4 sirs criteria with no obvious source of infection. Will need to consult GI services for possible PEG tube placement. Consult nutrition for recommendations concerning malnutrition and TPN requirements.  Blood and urine cx pending.  Will attempt to discuss case with rheumatogist erika.

## 2022-04-30 NOTE — DISCHARGE SUMMARY
Patient:   Abhay Luna            MRN: [id number]            FIN: [id number]               Age:   18 years     Sex:  Female     :  2000   Associated Diagnoses:   None   Author:   Javier RINCON, Juan PARADA      Basic Information   Source of history:  Self.    Referral source:  Emergency department.    History limitation:  None.       History of Present Illness   Date of admit: 10/29/2018  Date of discharge: 2018  Initial diagnoses: Tachycardia, Leukocytosis, Severe Malnutrition, Cystic Fibrosis, CF Related Diabetes, Polyarthralgia, Anemia  Final diagnoses: MRSA Pneumonia, Severe Malnutrition, Cystic Fibrosis, CF Related Diabetes, Polyarthralgia, Anemia  Procedures: PEG tube placement 18  Attending Physician: Leroy Swanson MD, IM  Consults: Shasta Flores MD, GI; Allyson Canas DO, Rheumatology     HPI: 17 yo F with hx of Cystic Fibrosis, CF Complications of Diabetes, GI Manifestations, Polyarthralgia, Primary Ammenorrhea, 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 During clinic visit patient noted to be tachycardic and tachypneic, however was afebrile. Also noted to have 3lb weight loss from previous visit 2 weeks prior. Patient states that she feels fine and denies any SOB, chest pain, palpitations, nausea/vomiting, or change in bowel habits. She does note that she becomes full faster while eating, only able to eat half her food typically. She has also had a weight loss of approx 20lbs since 3/2018. She has had an extensive evaluation at [facility] via their CF clinic. Her diagnostic considerations included rheumatoid arthritis or inflammatory bowel disease with joint manifestations. She has had upper and lower GI endoscopies and biopsies which were negative. Her rheumatologic interventions thus far have included prednisone, Plaquenil and NSAIDS.  Biologicals have not been included to date. Was admitted at [facility] on 9/17/18 for malnutrition with weight gain and improvement in nutrition status noted while inpatient in hospital and was to follow up with GI as outpatient. IM consulted for admission for severe malnutrition and for GI consult to consider PEG placement. CXR showed no acute processes. EKG was sinus tachycardia with no ST-T wave changes.  Hospital course:   At time of admission patient was noted to be SIRS 3/4 attributed to MRSA pneumonia and had an elevated WBC on 20.3 and a cough; she was started on IV Vanc and Zosyn as well as her home nebulized Tobramycin.  She was afebrile with lactic acid of 0.9.  Initial CXR without evidence of acute process but follow-up CT 11/1 did show evidence of likely infectious change of left lung base.  Blood and urine cultures obtained grew no organisms.  Respiratory cultures grew GNR and MRSA, sensitive to clindamycin and doxycycline.  On 11/3, antibiotic coverage changed to Clindamycin and Doxycycline.  Over the course of admit patient did well and remained afebrile without lactic acidosis.    Regarding her Severe Malnutrition, patient was unable to meet recommended daily caloric intake by mouth. PCP met with and discussed PEG tube placement with patient ultimately agreeing to undergo procedure and PEG placed 11/5 by GI.  Patient continued to tolerate PO intake with some supplementation via PEG though she was unable to tolerate large boluses.  She received extensive education on PEG feedings especially in setting of CF with enzyme replacement as feeds cannot be continuous.  Nutrition recommended Glucerna 1.2 three times daily to meet caloric requirements.     Cystic Fibrosis managed with home medications including pancreatic enzyme replacement (Creon 87508 units daily).  She maintained O2 saturations during course of stay.  Additional treatment with Duonebs as needed and Pulmozyme neb daily.    Given history of  Diabetes Secondary to CF, HgbA1c evaluated on admission and found to be 9.0.  With her severe malnutrition patient was maintained on a regular diet to get adequate caloric intake.  Patient received insulin glargine 20units twice daily and low dose insulin sliding scale ISS.    Regarding her polyarthralgias, patient initially continued on home dose Prednisone 40mg daily and Plaquenil 200mg BID.  Prednisone tapered to 10mg daily per rheumatology recommendation and patient seemed to tolerate this well with minimal increase in joint pain that was relived with prn ibuprofen. Rheumatology plans to start weekly methotrexate once patient discharged and will follow patient in outpatient setting afterwards.    Patient also noted to be anemic on admission with H/H of 9.2/29.2.  Fe studies consistent with anemia of chronic disease with low serum Fe and elevated Ferritin.  H/H trended down to 7.7/25.3 and she received 2units pRBCs on 11/1 with H/H increasing appropriately.  The remainder of stay she was able to tolerate ambulation and regular activities without symptoms and no further transfusions were needed.         Review of Systems   12 point ROS negative except as above      Health Status   Allergies:    Allergic Reactions (All)  No Known Allergies,    Allergies (1) Active Reaction  No Known Allergies None Documented   Current medications:  (Selected)   Prescriptions  Prescribed  Accu-Chek AvivaPlus test strips: Accu-Chek AvivaPlus test strips, See Instructions, E13.69  test blood sugar 2-3 times per day and as needed, # 100 EA, 5 Refill(s), Pharmacy: [facility]  AquADEKs oral tablet, chewable: 1 tab(s), Oral, Daily, # 30 tab(s), 6 Refill(s), Pharmacy: [facility]  Bethkis 75 mg/mL inhalation solution: 300 mg = 4 mL, NEB, BID, # 56 EA, 5 Refill(s), Pharmacy: [facility]  Creon 24,000 units oral delayed release capsule: See Instructions, 3 cap(s) Oral TID 30 day(s) with meals  1 caps with snacks, # 630  cap(s), 5 Refill(s), Pharmacy: [facility]  Hyper-Sal 7% inhalation solution: 1 neb, INH, BID, Do not mix with Pulmozyne., # 60 amp, 6 Refill(s), Pharmacy: [facility]  Mediport flush: Mediport flush, See Instructions, access and flush mediport with 5 ml of heparin, 100 units per ml  Q 4 wees, # 1 EA, 11 Refill(s)  Nasal Saline 0.65% solution: 2 drop(s), Nasal, q2hr, # 1 box(es), 11 Refill(s), Pharmacy: [facility]  One Touch Delica 31 guage lancets: One Touch Delica 31 guage lancets, See Instructions, check pblood sugar twice a day, # 100 EA, 5 Refill(s), Pharmacy: [facility]  Plaquenil 200 mg oral tablet: 200 mg = 1 tab(s), Oral, BID, with food or milk, # 60 tab(s), 5 Refill(s), Pharmacy: [facility]  ProAir HFA 90 mcg/inh inhalation aerosol with adapter: 2 puff(s), INH, BID, 2 puffs BID prior to Hypersal treatment and q4h PRN cough, wheezing, # 1 EA, 6 Refill(s), Pharmacy: [facility]  Pulmozyme 2.5 mg/2.5 mL inhalation solution: 2.5 mg = 2.5 mL, NEB, Daily, Do not mix with Hyper-Sal., # 1 box(es), 6 Refill(s), Pharmacy: [facility]  Toujeo SoloStar 300 units/mL subcutaneous solution: 20 units, Subcutaneous, BID, # 4.5 mL, 5 Refill(s), Pharmacy: [facility]  azithromycin 500 mg oral tablet: See Instructions, Take 1 tab three times per week (Monday, Wednesday, Friday)  Dx: E84, # 36 tab(s), 1 Refill(s), Pharmacy: [facility]  cyproheptadine 4 mg oral tablet: 2 mg = 0.5 tab(s), Oral, BID, Take half a tablet by mouth twice daily (before breakfast and dinner)., # 30 tab(s), 5 Refill(s), Pharmacy: [facility]  doxycycline hyclate 100 mg oral tablet: 100 mg = 1 cap(s), Oral, BID, # 10 cap(s), 0 Refill(s), Pharmacy: [facility]  montelukast 10 mg oral TABLET: 10 mg = 1 tab(s), Oral, Daily, # 30 tab(s), 5 Refill(s), Pharmacy: [facility]  naproxen 375 mg oral delayed release tablet: 375 mg = 1 tab(s), Oral, BID, with food  do not chew or  break tablets, # 60 tab(s), 3 Refill(s), Pharmacy: [facility]  sure comfort 31G Pen Needles: sure comfort 31G Pen Needles, See Instructions, use as directed for insulin injectionis, # 100 EA, 5 Refill(s), Pharmacy: [facility]  Documented Medications  Documented  HUMALOG 100 UNIT/ML SOLN:   Template Non-Formulary Med: 0 Refill(s)  Template Non-Formulary Med: 0 Refill(s)  Template Non-Formulary Med: 0 Refill(s)  albuterol 0.083% inhalation solution: 2.5 mg = 3 mL, NEB, q6hr, PRN PRN wheezing, 0 Refill(s)  prednisONE 10 mg oral tablet: 10 mg = 1 tab(s), Oral, Daily, 0 Refill(s)  sodium chloride 7% inhalation solution: 0.28 gm = 4 mL, INH, BID, 0 Refill(s),    No qualifying data available   Problem list:    All Problems (Selected)  Allergic rhinitis / SNOMED CT 180216569 / Confirmed  Anemia of chronic disease / SNOMED CT 609934584 / Confirmed  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 / SNOMED CT 524329024 / Confirmed  Chronic disease-related malnutrition / SNOMED CT 998778009288277 / Confirmed  CF (cystic fibrosis) / SNOMED CT 766098916 / Confirmed  CYSTIC FIBROSIS WITH GASTROINTESTINAL MANIFESTATIONS / ICD-9-.03 / Confirmed  Cystic fibrosis with pulmonary exacerbation / ICD-9-.02 / Confirmed  Diabetes mellitus related to cystic fibrosis / SNOMED CT 2848000478 / Confirmed  Diabetes mellitus related to cystic fibrosis / ICD-9-.00 / Confirmed  Encounter for adjustment or management of vascular access device / SNOMED CT 029710422 / Confirmed  Hypoalbuminemia / SNOMED CT 389028149 / Confirmed  Impaired mobility / SNOMED CT 659999843 / Confirmed / Improving  Severe malnutrition / SNOMED CT 35870135 / Confirmed  Polyarticular arthritis / SNOMED CT 79829210 / Confirmed  Primary amenorrhea / SNOMED CT 61724712 / Confirmed  Pseudomonas infection / ICD-9-.7 / Confirmed  Vomiting / SNOMED CT 0052736599 / Confirmed  Weight loss / SNOMED CT 501516013 / Confirmed      Physical Examination       Vital Signs (last 24 hrs)_____  Last Charted___________  Temp Oral     36.7 DegC  (NOV 13 12:30)  Heart Rate Peripheral  H 112bpm  (NOV 13 12:30)  Resp Rate         18 br/min  (NOV 13 12:30)  SBP      107 mmHg  (NOV 13 12:30)  DBP      66 mmHg  (NOV 13 12:30)  SpO2   99 %  (NOV 13 13:05)   General:  Alert and oriented, No acute distress, cachectic.    Eye:  Extraocular movements are intact.    Respiratory:  Breath sounds are equal, No chest wall tenderness, bilateral lower lobe crackles improved.    Cardiovascular:  Normal rate at time of exam, Regular rhythm, No murmur, Good pulses equal in all extremities.    Gastrointestinal:  Soft, Non-tender, Non-distended, Normal bowel sounds, PEG tube in place with dressing intact, no evidence of bleeding or drainage  Musculoskeletal:  No tenderness, No swelling.    Neurologic:  No focal deficits, Cranial Nerves II-XII are grossly intact.    Cognition and Speech:  Speech clear and coherent.    Psychiatric:  Appropriate mood & affect.          Review / Management   Results review:  All Results   11/13/2018 4:25 CST      WBC                       11.3 x10(3)/mcL  HI                             RBC                       3.47 x10(6)/mcL  LOW                             Hgb                       10.0 gm/dL  LOW                             Hct                       31.2 %  LOW                             Platelet                  294 x10(3)/mcL                             MCV                       89.9 fL                             MCH                       28.8 pg                             MCHC                      32.1 gm/dL                             RDW                       14.4 %                             MPV                       8.7 fL                             Abs Neut                  7.85 x10(3)/mcL                             Neutro Auto               69 x10(3)/mcL  NA                             Lymph Auto                22 %                              Mono Auto                 6 %                             Eos Auto                  2  NA                             Abs Eos                   0.27 x10(3)/mcL  NA                             Basophil Auto             0  NA                             Abs Neutro                7.85 x10(3)/mcL  NA                             Abs Lymph                 2.45 x10(3)/mcL  NA                             Abs Mono                  0.64 x10(3)/mcL  NA                             Abs Baso                  0.03 x10(3)/mcL  NA                             IG%                       0 %  NA                             IG#                       0.0600  NA                             Sed Rate                  106 mm/hr  HI                             Sodium Lvl                136 mmol/L                             Potassium Lvl             4.3 mmol/L                             Chloride                  99 mmol/L                             CO2                       28 mmol/L                             Calcium Lvl               8.9 mg/dL                             Glucose Lvl               153 mg/dL  HI                             BUN                       25 mg/dL  HI                             Creatinine                0.60 mg/dL                             eGFR-AA                   >105 mL/min                             eGFR-LIZETTE                  >105 mL/min                             Bili Total                0.2 mg/dL                             Bili Direct               <0.1 mg/dL                             Bili Indirect             calc not valid mg/dL                             AST                       11 unit/L  LOW                             ALT                       19 unit/L                             Alk Phos                  112 unit/L                             Total Protein             8.4 gm/dL  HI                             Albumin Lvl               1.8 gm/dL  LOW                             Globulin                   6.60 gm/mL  HI                             A/G Ratio                 0 ratio  LOW                             CRP                       19.0 mg/dL  HI  .       Impression and Plan     Discharge location: Home into care of family  Condition Upon Dischagre: Stable  Activity: Resume baseline level activity as tolerated  Follow up: ED precautions provided;  Follow-up with Dr. Campo (PCP) in 2-4 weeks, Dr. Shasta Coronado (GI) in 2-4 weeks, Dr. Allyson Canas (Rheumatology) in 2-4 weeks.  Medications upon Discharge: Insulin glargine 20 units BID, Prednisone 10mg daily; resume other home medications  Issues to be adressed at follow-up: Improvement of symptoms, medications compliance, weight gain, PEG tube maintenance

## 2022-05-04 NOTE — HISTORICAL [facility] CERNER
This is a historical note converted from Na. Formatting and pictures may have been removed.  Please reference Na for original formatting and attached multimedia. Chief Complaint  PCP DR CORDOVA. Child is c/o non prod cough. Requesting neb tx. Resp paged. Appetite has been poor. Has been hvaing some nausea. ?C/O throat pain. Rates pain a 7/10. Currently has temporal temp of 38.7. 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 Cough is non-productive. She has continued with her G-J feedings 85 ml per hour 22 hours per day but has not been eating meals very often.? Blood sugars continue high.? She has finished weaning her prednisone and feels her joints are doing worse.? Reviewed blood sugars and the majority are 180-300.?  ?   Had G-J tube placed 2019 with good results.  ?   She has been [facility] at [facility] by Dr. Ortiz?last week with no changes in meds.? Abhay reports that she has had no further nausea and is eating three meals a a day plus her tube feedings of Glucerna 1.5 at 75 ml per hour about 18 hours per day.?????  ?   Saw optometry 19 and normal exam.? ?  ?   Cough described as mild to moderate and dry.??Denies fever, chest pain or dyspnea.? Is generally fatigued and weak--no noticeable improvement yet.  ?  ?   Blood sugars [id number].? Urinating?less now, no night time awakenings.? Current insulin 33 unites glargine (Tujeo) ?in the morning and 27 in the evening.? 14 units Humalog with meals ( not taking now as not eating).  Review of Systems  General: No constitutional symptoms of fever  Head: No headaches  Eyes: had eye exam  at school with right eye: 20/40 and left: 20/30; has ophthalmology May 27  Nasal: There are no complaints of nasal discharge. There is no snoring or sleep apnea.  Throat: There are no complaints of sore throat.  Neck: no swollen glands  Chest: cough?is ?increased today  CVS: No palpitations or chest pain, no history of cardiac malformations or dysrhythmias  Abdomen/ GI: see PI, no diarrhea or rectal bleeding  : No dysuria, no bed wetting or incontinence.  Skin: skin is very dry  Neurologic: No paraesthesias, no seizures [1]  Psych:?? fatigued- no change  Ext:? multiple joint pains and difficulty with ambulation [1]  Physical Exam  Vitals & Measurements  T:?38.7? ?C (Temporal Artery)? T:?37.4? ?C (Oral)? HR:?115(Peripheral)? HR:?115(Apical)? RR:?24? BP:?113/71? SpO2:?87%?  WT:?37.7?kg?  General: Alert and oriented,?malnourished .? Mood obviously improved as well.?  ???? Skin:? very dry but less so than previous  ???? Eye: Pupils are equal, round and reactive to light, Extraocular movements are intact, Normal conjunctiva.? No corneal clouding or clouding of the anterior chamber.?  ?????HEENT: Normocephalic, Tympanic membranes are clear, Normal hearing, Oral mucosa is moist.  ?????Neck: Supple, Non-tender, No carotid bruit, no thyromegaly.  ?????Respiratory: Mildly reduced airflow, Respirations are non-labored, Breath sounds are equal, Symmetrical chest wall expansion. Fine crackles evident both lower lung fields.?  ?????Cardiovascular: Tachycardia evident, Regular rhythm, No murmur.No gallop.? No pedal edema.  ?????Gastrointestinal: Soft, Non-tender, Non-distended d, No organomegaly.? Abdomen scaphoid  ?????Genitourinary: No costovertebral angle tenderness.  ?????Lymphatics: No lymphadenopathy neck, axilla, groin.  ?????Musculoskeletal:?Synovial thickening of both wrists and striking wasting of her hands.? ROM of both wrists limited to about 30 degrees flexion and extension.? Both elbows with loss of ROM of about 20  degrees and pain on ROM.???No problems with shoulders, hips or ROM at knees.? Both knees do have small effusions.  ?????Integumentary: Warm, Dry,?Intact, no rashes  ?????Neurologic: Alert, Oriented, Normal sensory, Normal motor function, No focal deficits.  ?????Psychiatric: Cooperative, Appropriate mood & affect [2]  Assessment/Plan  1.?CF (cystic fibrosis)?E84.0  ?There are concerns about exacerbation but the CXR shows no change and oxygen saturation good after Duonebs.  Ordered:  Blood Culture, 07/11/19 11:38:00 CDT, Now collect, Blood, Mediport, Stop date 07/11/19 11:41:00 CDT, CF (cystic fibrosis)  Chronic disease-related malnutrition  Diabetes mellitus related to cystic fibrosis  Gram Stain, Routine collect, 07/11/19 11:37:00 CDT, Order for future visit, Sputum, Nurse collect, Stop date 07/11/19 11:37:00 CDT, CF (cystic fibrosis)  Chronic disease-related malnutrition  Diabetes mellitus related to cystic fibrosis  Respiratory Culture, Routine collect, 07/11/19 11:37:00 CDT, Order for future visit, Sputum, Nurse collect, Stop date 07/11/19 11:37:00 CDT, CF (cystic fibrosis)  Chronic disease-related malnutrition  Diabetes mellitus related to cystic fibrosis  ?  2.?Chronic disease-related malnutrition?E46  Will consider increase in enteral tube feedings  Ordered:  Blood Culture, 07/11/19 11:38:00 CDT, Now collect, Blood, Mediport, Stop date 07/11/19 11:41:00 CDT, CF (cystic fibrosis)  Chronic disease-related malnutrition  Diabetes mellitus related to cystic fibrosis  Gram Stain, Routine collect, 07/11/19 11:37:00 CDT, Order for future visit, Sputum, Nurse collect, Stop date 07/11/19 11:37:00 CDT, CF (cystic fibrosis)  Chronic disease-related malnutrition  Diabetes mellitus related to cystic fibrosis  Respiratory Culture, Routine collect, 07/11/19 11:37:00 CDT, Order for future visit, Sputum, Nurse collect, Stop date 07/11/19 11:37:00 CDT, CF (cystic fibrosis)  Chronic disease-related malnutrition   Diabetes mellitus related to cystic fibrosis  ?  3.?Diabetes mellitus related to cystic fibrosis?E84.9  Recently weaned off of steroids and will defer adjustment of insulin until off for several weeks.  Ordered:  Blood Culture, 07/11/19 11:38:00 CDT, Now collect, Blood, Mediport, Stop date 07/11/19 11:41:00 CDT, CF (cystic fibrosis)  Chronic disease-related malnutrition  Diabetes mellitus related to cystic fibrosis  Gram Stain, Routine collect, 07/11/19 11:37:00 CDT, Order for future visit, Sputum, Nurse collect, Stop date 07/11/19 11:37:00 CDT, CF (cystic fibrosis)  Chronic disease-related malnutrition  Diabetes mellitus related to cystic fibrosis  Respiratory Culture, Routine collect, 07/11/19 11:37:00 CDT, Order for future visit, Sputum, Nurse collect, Stop date 07/11/19 11:37:00 CDT, CF (cystic fibrosis)  Chronic disease-related malnutrition  Diabetes mellitus related to cystic fibrosis  ?  Orders:  IMPL Venous Acc De - Lab blood collect PC, 07/11/19 12:17:00 CDT, [facility], Routine, 07/11/19 12:17:00 CDT  Irrigation drug delivery device TC, 07/11/19 11:51:00 CDT, [facility], Routine, 07/11/19 11:51:00 CDT  Medi-Port Access, 07/11/19 11:51:00 CDT  return 3 months.   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  CF (cystic fibrosis)  Chronic disease-related malnutrition  CYSTIC FIBROSIS WITH GASTROINTESTINAL MANIFESTATIONS  Diabetes mellitus related to cystic fibrosis  Encounter for adjustment or management of vascular access device  Gastrostomy status  Hypoalbuminemia  Polyarticular arthritis  Primary amenorrhea  Protein calorie malnutrition  Vomiting  Weight loss  Historical  Cystic fibrosis with pulmonary exacerbation  Pseudomonas infection  Procedure/Surgical History  NEW PEG TUBE INSERTED (05/06/2019)  Esophagogastroduodenoscopy, flexible, transoral; with biopsy, single or multiple  (11/05/2018)  Esophagogastroduodenoscopy, flexible, transoral; with directed placement of percutaneous gastrostomy tube (11/05/2018)  Excision of Duodenum, Via Natural or Artificial Opening Endoscopic, Diagnostic (11/05/2018)  Insertion of Feeding Device into Stomach, Percutaneous Approach (11/05/2018)  PEG Tube Insertion Initial (11/05/2018)  MEDIPORT REMOVAL AND RE-PLACEMENT (05/15/2018)  Venous catheterization, not elsewhere classified (11/23/2013)  Venous catheterization, not elsewhere classified (06/17/2013)  Mediport placement  Mediport removal  NEW PEG TUBE INSERTED , PEG BUTTON  OLD PEG TUBE REMOVED   Medications  Accu-Chek AvivaPlus test strips, See Instructions, 5 refills  albuterol 0.083% inhalation solution, 2.5 mg= 3 mL, NEB, q6hr, PRN, 6 refills  AquADEKs oral tablet, chewable, 1 tab(s), Oral, Daily, 6 refills  azithromycin 500 mg oral tablet, See Instructions, 5 refills  Bethkis 75 mg/mL inhalation solution, 300 mg= 4 mL, NEB, BID, 5 refills  Creon 24,000 units oral delayed release capsule, See Instructions, 5 refills  cyproheptadine 4 mg oral tablet, 2 mg= 0.5 tab(s), Oral, BID, 5 refills  DuoNeb, 3 mL, NEB, Once-Unscheduled  Enbrel SureClick 50 mg/mL subcutaneous solution, 50 mg, Subcutaneous, qWeek, 3 refills  ergocalciferol 50,000 intl units (1.25 mg) oral capsule, 33095 IntUnit= 1 cap(s), Oral, qWeek  ferrous sulfate 220 mg/5 mL (44 mg elemental iron) oral elixir, 440 mg= 10 mL, Oral, Daily, 4 refills  folic acid 1 mg oral tablet, 1 mg= 1 tab(s), Oral, Daily, 3 refills  Generic, See Instructions  generic, See Instructions, 4 refills  Glucometer, See Instructions  HumaLOG 100 units/mL injectable solution, 14 units, Subcutaneous, TIDAC, 6 refills  Humira, See Instructions  hydroxychloroquine 200 mg oral tablet  Hyper-Sal 7% inhalation solution, 1 neb, INH, BID, 6 refills  levofloxacin 750 mg oral tablet, 750 mg= 1 tab(s), Oral, Daily  Mediport flush, See Instructions, 11 refills  methylPREDNISolone  4 mg oral tab, See Instructions,? ?Not Taking, Completed Rx  montelukast 10 mg oral TABLET, 10 mg= 1 tab(s), Oral, Daily  naproxen 375 mg oral tablet, 375 mg= 1 tab(s), Oral, BID, 5 refills  ondansetron 4 mg oral tablet  ProAir HFA 90 mcg/inh inhalation aerosol with adapter, 2 puff(s), INH, BID, 6 refills  Pulmozyme 2.5 mg/2.5 mL inhalation solution, 2.5 mg= 2.5 mL, NEB, Daily, 6 refills  Rasuvo 25 mg/0.5 mL subcutaneous solution, 25 mg, Subcutaneous, qWeek, 3 refills  sure comfort 31G Pen Needles, See Instructions, 5 refills  Toujeo Max SoloStar 300 units/mL subcutaneous solution, 20 units, Subcutaneous, BID, 5 refills  Allergies  No Known Allergies  Social History  Abuse/Neglect  No, No, Yes, 07/11/2019  Alcohol - Low Risk, 06/17/2013  Never, 03/31/2016  Employment/School - Medium Risk, 06/17/2013  Student, 06/11/2019  Exercise - Regular exercise, 06/03/2015  Exercise frequency: Daily., 03/31/2016  Home/Environment - Low Risk, 06/17/2013  Lives with Mother, Siblings. Alcohol abuse in household: No. Substance abuse in household: No. Smoker in household: No. Injuries/Abuse/Neglect in household: No. Feels unsafe at home: No. Safe place to go: Yes. Agency(s)/Others notified: No. Family/Friends available for support: Yes. Concern for family members at home: Yes. Major illness in household: Yes. Financial concerns: Yes. TV/Computer concerns: Yes., 03/31/2016  Nutrition/Health - Medium Risk, 06/17/2013  Regular, Tube feeding, Poor, 07/11/2019  Sexual  Sexual orientation: Straight or heterosexual. Gender Identity Identifies as female., 02/28/2019  Sexually active: No., 03/31/2016  Spiritual/Cultural  Yazidism, No, 07/11/2019  Substance Use - Low Risk, 06/17/2013  Never, 03/31/2016  Tobacco - Low Risk, 06/17/2013  Never (less than 100 in lifetime), N/A, 07/11/2019  Family History  Asthma: Mother.  Eczema: Mother.  Immunizations  Vaccine Date Status Comments   influenza virus vaccine, inactivated 10/11/2018 Given Med Not  Available  VFC unavailable at this time. ?Patient status required administration now per Dr. Campo   influenza virus vaccine, inactivated 10/19/2017 Given    influenza virus vaccine, inactivated 11/16/2016 Given    meningococcal conjugate vaccine 01/04/2016 Given    hepatitis A pediatric vaccine 01/04/2016 Given    hepatitis A pediatric vaccine 01/14/2015 Recorded    influenza virus vaccine, inactivated 11/06/2014 Recorded    influenza virus vaccine, inactivated 09/16/2013 Recorded    influenza virus vaccine, inactivated 10/12/2012 Recorded    influenza virus vaccine, inactivated 09/15/2011 Recorded    tetanus/diphtheria/pertussis, acel(Tdap) 12/08/2010 Recorded    meningococcal conjugate vaccine 12/08/2010 Recorded    influenza virus vaccine, live, trivalent 11/17/2009 Recorded    varicella virus vaccine 03/24/2009 Recorded    influenza virus vaccine, live, trivalent 10/29/2008 Recorded    influenza virus vaccine, inactivated 10/23/2007 Recorded    influenza virus vaccine, inactivated 11/09/2006 Recorded    influenza virus vaccine, inactivated 12/12/2005 Recorded    influenza virus vaccine, inactivated 11/01/2004 Recorded    poliovirus vaccine, inactivated 11/25/2003 Recorded    measles/mumps/rubella virus vaccine 11/25/2003 Recorded    diphtheria/pertussis, acel/tetanus ped 11/25/2003 Recorded    pneumococcal 7-valent vaccine 11/25/2003 Recorded    varicella virus vaccine 02/12/2001 Recorded    diphtheria/pertussis, acel/tetanus ped 02/12/2001 Recorded    pneumococcal 7-valent vaccine 02/12/2001 Recorded    haemophilus b conjugate (PRP-T) vaccine 11/09/2000 Recorded    poliovirus vaccine, inactivated 11/09/2000 Recorded    measles/mumps/rubella virus vaccine 11/09/2000 Recorded    diphtheria/pertussis, acel/tetanus ped 11/09/2000 Recorded    hepatitis B pediatric vaccine 08/23/2000 Recorded    hepatitis B pediatric vaccine 03/06/2000 Recorded    hepatitis B pediatric vaccine 01/03/2000 Recorded    Health  Maintenance  Health Maintenance  ???Pending?(in the next year)  ??? ??OverDue  ??? ? ? ?Diabetes Maintenance-Fasting Lipid Profile due??11/30/14??and every 1??year(s)  ??? ? ? ?Diabetes Maintenance-Eye Exam due??11/08/18??and every 1??year(s)  ??? ? ? ?Alcohol Misuse Screening due??01/01/19??and every 1??year(s)  ??? ??Due?  ??? ? ? ?Diabetes Maintenance-Foot Exam due??07/11/19??and every?  ??? ??Due In Future?  ??? ? ? ?Obesity Screening not due until??01/01/20??and every 1??year(s)  ??? ? ? ?Depression Screening not due until??05/12/20??and every 1??year(s)  ??? ? ? ?ADL Screening not due until??05/13/20??and every 1??year(s)  ??? ? ? ?Diabetes Maintenance-HgbA1c not due until??06/10/20??and every 1??year(s)  ??? ? ? ?Blood Pressure Screening not due until??07/10/20??and every 1??year(s)  ??? ? ? ?Body Mass Index Check not due until??07/10/20??and every 1??year(s)  ???Satisfied?(in the past 1 year)  ??? ??Satisfied?  ??? ? ? ?ADL Screening on??05/13/19.??Satisfied by Alicia Powell LPN  ??? ? ? ?Blood Pressure Screening on??07/11/19.??Satisfied by Mary Fisher LPN  ??? ? ? ?Body Mass Index Check on??05/13/19.??Satisfied by SYSTEM  ??? ? ? ?Depression Screening on??05/13/19.??Satisfied by Shannon Gomes LPN  ??? ? ? ?Diabetes Maintenance-HgbA1c on??06/11/19.??Satisfied by Lucie Che  ??? ? ? ?Diabetes Screening on??06/11/19.??Satisfied by Lucie Che  ??? ? ? ?Influenza Vaccine on??03/15/19.??Satisfied by Basilia Hirsch RN  ??? ? ? ?Obesity Screening on??07/11/19.??Satisfied by Mary Fisher LPN  ?     [1]?Office Visit Note; Chucho Campo MD 06/11/2019 13:09 CDT  [2]?Office Visit Note; Chucho Campo MD 06/11/2019 13:09 CDT   error, return 4 weeks

## 2022-05-04 NOTE — HISTORICAL [facility] CERNER
This is a historical note converted from Na. Formatting and pictures may have been removed.  Please reference [employer] for original formatting and attached multimedia. Chief Complaint  Here for f/u & port flush. hx CF, no concerns. UTD vaccines.  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 She is noted to again appear cachetic, fatigued and has lost weight since the last visit several weeks ago.? Abhay acknowledges mild increase in cough compared to base line but does not feel increased dyspnea.? She denies fevers. Her joint pains are ongoing , worse in the mornings and limit her mobility.? She denies further vomiting, diarrhea, abdominal pain and states she is eating 3 meals per day plus snacks.? Her weight at home has been  up and down.?  ?   Demelchore?was started or oral hormones earlier this year due to her primary amenorrhea.? She developed vomiting with dramatic weight loss over a period of several ?weeks? although in retrospect? a pattern of weight loss preceded the vomiting that was related to use of oral contraceptives which were begun 3/12/18.?  ?   Weight:  17?????? 48.2 kg  10/19/17??? 45.5 kg  3/12/18????? 41.3 kg (OCP begun this date)  18??????36.4 kg? Knee effusion and synovial thickening of wrists first noted.? Recent lab from [facility] reviewed darrell included marked hypoalbuminemia, grossly elevated CRP and sed rate, normocytic anemia and negative RA factor ( OFELIA not done)  ?   An admission with extensive work?-up ?ensued in May 2018 at [facility] and included negative bone marrow, negative ANC A antibodies, negative upper and lower endoscopies and biopsies, mildly elevated calprotectin and negative studies for  STDs, CMV and EBV. There was disagreement between GI and rheumatology over the basic underlying process and she was discharged on oral prednisone and NSAIDS  ?   There was an admission September 2018 at [facility] for malnutrition.?There was transfusion for critical anemia and patient discharged with nutritional advice as well as ciprofloxacin and TMP/SMX for presumed CF exacerbation related to MRSA ands multiple gram negative organisms.?  ?  ?  Review of Systems  General: No constitutional symptoms of fever, is very fatigued  Head: No headaches  Eyes: had eye exam at school with right eye: 20/40 and left: 20/30; has not seen ophtho yet  Nasal: There are no complaints of nasal discharge. There is no snoring or sleep apnea.  Throat: There are no complaints of sore throat.  Neck: no swollen glands  Chest: cough has increased  CVS: No palpitations or chest pain, no history of cardiac malformations or dysrhythmias  Abdomen/ GI: see PI, no diarrhea or rectal bleeding  : No dysuria, urgency or frequency, no bed wetting or incontinence.  Skin: skin ins very dry  Neurologic: No paraesthesias, no seizures [1]  Psych:? fatigued  Ext:? multiple joint pains and difficulty with ambulation [1]  Physical Exam  Vitals & Measurements  T:?37.2? ?C (Temporal Artery)? HR:?174(Peripheral)? HR:?174(Apical)? RR:?38? BP:?94/58?  WT:?31.3?kg? WT:?31.3?kg?  General: Alert and oriented,?cachectic ( striking compared to visits earlier this year). Appears fatigued.?  ???? Skin:? very dry  ???? Eye: Pupils are equal, round and reactive to light, Extraocular movements are intact, Normal conjunctiva.  ?????HEENT: Normocephalic, Tympanic membranes are clear, Normal hearing, Oral mucosa is moist.  ?????Neck: Supple, Non-tender, No carotid bruit, no thyromegaly.  ?????Respiratory: Mildly reduced airflow, Respirations are non-labored, Breath sounds are equal, Symmetrical chest wall expansion. Fine crackles evident both lower lung fields (new finding from  previous). Increase in RR noted and mild increase in work of breathing.  ?????Cardiovascular: Tachycardia evident Normal rate, Regular rhythm, No murmur.No gallop.? No pedal edema.  ?????Gastrointestinal: Soft, Non-tender, Non-distended, No organomegaly.? Abdomen scaphoid  ?????Genitourinary: No costovertebral angle tenderness.  ?????Lymphatics: No lymphadenopathy neck, axilla, groin.  ?????Musculoskeletal:?effusion of?both ?knees with?preservation of ?ROM. synovial thickening of both wrists and striking wasting of her hands.? ROM of both wrists limited to about 30 degrees flexion and extension.? Both elbows with loss of ROM of about 20 degrees and pain on ROM.???No problems with shoulders, hips or ROM at knees.? Both knees do have small effusions.  ?????Integumentary: Warm, Dry,?Intact, no rashes  ?????Neurologic: Alert, Oriented, Normal sensory, Normal motor function, No focal deficits.  ?????Psychiatric: Cooperative, Appropriate mood & affect [2]  Assessment/Plan  1.?Severe malnutrition  ?Admit for GI and Rheumatology consult as well as intensive nutritional rehabilitation.  ?  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  3.?CF (cystic fibrosis)  ?CXR today appears to have increased infiltration in the left lung fields along with increase rales suggests CF exacerbation.? sputum cultures to include AFB indicated.  Ordered:  Electrocardiogram Adult 12 Lead, 10/29/18 13:22:00 CDT, Routine, 10/29/18 13:22:00 CDT, Wheelchair, Patient Has IV, Standard Precautions, Orders for Future Visit, -1, -1, 10/29/18 13:22:00 CDT, CF (cystic fibrosis)  Tachycardia  IMPL Venous Acc De - Lab blood collect PC, 10/29/18 16:22:00 CDT, [facility], Routine, 10/29/18 16:22:00 CDT  Irrigation drug  delivery device TC, 10/29/18 16:21:00 CDT, [facility], Routine, 10/29/18 16:21:00 CDT  ?  3.?Polyarticular arthritis  Rheumatology consult pending  Ordered:  IMPL Venous Acc De - Lab blood collect PC, 10/29/18 16:22:00 CDT, [facility], Routine, 10/29/18 16:22:00 CDT  Irrigation drug delivery device TC, 10/29/18 16:21:00 CDT, [facility], Routine, 10/29/18 16:21:00 CDT  ?  4.?Anemia of chronic disease  ?currently stable  ?  5.?Chronic disease-related malnutrition  ?At this point critical and jeopardizing immune status  Ordered:  IMPL Venous Acc De - Lab blood collect PC, 10/29/18 16:22:00 CDT, [facility], Routine, 10/29/18 16:22:00 CDT  Irrigation drug delivery device TC, 10/29/18 16:21:00 CDT, [facility], Routine, 10/29/18 16:21:00 CDT  ?  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 Intensified insulin therapy indicated.  Ordered:  IMPL Venous Acc De - Lab blood collect PC, 10/29/18 16:22:00 CDT, [facility], Routine, 10/29/18 16:22:00 CDT  Irrigation drug delivery device TC, 10/29/18 16:21:00 CDT, [facility], Routine, 10/29/18 16:21:00 CDT  ?  6.?Hypoalbuminemia  Ordered:  IMPL Venous Acc De - Lab blood collect PC, 10/29/18 16:22:00 CDT, [facility], Routine, 10/29/18 16:22:00 CDT  Irrigation drug delivery device TC, 10/29/18 16:21:00 CDT, [facility], Routine, 10/29/18 16:21:00 CDT  ?  7.?Primary amenorrhea  Ordered:  IMPL Venous Acc De - Lab blood collect PC, 10/29/18 16:22:00 CDT, [facility], Routine, 10/29/18 16:22:00 CDT  Irrigation drug delivery device TC, 10/29/18 16:21:00 CDT, [facility], Routine, 10/29/18 16:21:00 CDT  ?  8.?Weight loss  Ordered:  IMPL Venous Acc De - Lab blood collect PC, 10/29/18 16:22:00 CDT, [facility], Routine, 10/29/18 16:22:00 CDT  Irrigation drug delivery device TC, 10/29/18 16:21:00 CDT, [facility], Routine, 10/29/18 16:21:00  CDT  ?  9.?Vomiting  Ordered:  IMPL Venous Acc De - Lab blood collect PC, 10/29/18 16:22:00 CDT, [facility], Routine, 10/29/18 16:22:00 CDT  Irrigation drug delivery device TC, 10/29/18 16:21:00 CDT, [facility], Routine, 10/29/18 16:21:00 CDT  ?  10.?Tachycardia,?  ?? May be related to CF exacerbation or volume contraction. Systemic infection not excluded.  Tachycardia  Ordered:  Electrocardiogram Adult 12 Lead, 10/29/18 13:22:00 CDT, Routine, 10/29/18 13:22:00 CDT, Wheelchair, Patient Has IV, Standard Precautions, Orders for Future Visit, -1, -1, 10/29/18 13:22:00 CDT, CF (cystic fibrosis)  Tachycardia  IMPL Venous Acc De - Lab blood collect PC, 10/29/18 16:22:00 CDT, [facility], Routine, 10/29/18 16:22:00 CDT  Irrigation drug delivery device TC, 10/29/18 16:21:00 CDT, [facility], Routine, 10/29/18 16:21:00 CDT  ?  11.?Encounter for adjustment or management of vascular access device  ?   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  Encounter for adjustment or management of vascular access device  Hypoalbuminemia  Polyarticular arthritis  Primary amenorrhea  Pseudomonas infection  Vomiting  Weight loss  Historical  No qualifying data  Procedure/Surgical History  MEDIPORT REMOVAL AND RE-PLACEMENT (05/15/2018)  Venous catheterization, not elsewhere classified (11/23/2013)  Venous catheterization, not elsewhere classified (06/17/2013)  Mediport placement  Mediport removal   Medications  Accu-Chek AvivaPlus test strips, See Instructions, 5 refills  AquADEKs oral tablet, chewable, 1 tab(s), Oral, Daily, 6 refills  azithromycin 500 mg oral tablet, See Instructions, 1 refills  Bethkis 75  mg/mL inhalation solution, 300 mg= 4 mL, NEB, BID, 5 refills  Creon 24,000 units oral delayed release capsule, See Instructions, 5 refills  cyproheptadine 4 mg oral tablet, 2 mg= 0.5 tab(s), Oral, BID, 5 refills  HUMALOG 100 UNIT/ML SOLN  Hyper-Sal 7% inhalation solution, 1 neb, INH, BID, 6 refills  Mediport flush, See Instructions, 11 refills  montelukast 10 mg oral TABLET, 10 mg= 1 tab(s), Oral, Daily, 5 refills  naproxen 375 mg oral delayed release tablet, 375 mg= 1 tab(s), Oral, BID, 3 refills  Nasal Saline 0.65% solution, 2 drop(s), Nasal, q2hr, 11 refills  One Touch Delica 31 guage lancets, See Instructions, 5 refills  Plaquenil 200 mg oral tablet, 200 mg= 1 tab(s), Oral, BID, 5 refills  prednisONE 10 mg oral tablet, 20 mg= 2 tab(s), Oral, Daily, 1 refills  ProAir HFA 90 mcg/inh inhalation aerosol with adapter, 2 puff(s), INH, BID, 6 refills  Pulmozyme 2.5 mg/2.5 mL inhalation solution, 2.5 mg= 2.5 mL, NEB, Daily, 6 refills  sure comfort 31G Pen Needles, See Instructions, 5 refills  Toujeo SoloStar 300 units/mL subcutaneous solution, 16 units, Subcutaneous, Daily, 5 refills  Allergies  No Known Allergies  Social History  Alcohol - Low Risk, 06/17/2013  Never, 03/31/2016  Employment/School - Medium Risk, 06/17/2013  Student, 10/11/2018  Student, Work/School description: 12th grade. Homeschooled., 09/17/2018  Student, 06/05/2018  Student, Work/School description: 11th grade., 10/19/2017  Student, Work/School description: 10 th grade at [employer]., 11/16/2016  Student, 09/23/2015  Exercise - Regular exercise, 06/03/2015  Exercise frequency: Daily., 03/31/2016  Home/Environment - Low Risk, 06/17/2013  Lives with Mother, Siblings. Alcohol abuse in household: No. Substance abuse in household: No. Smoker in household: No. Injuries/Abuse/Neglect in household: No. Feels unsafe at home: No. Safe place to go: Yes. Agency(s)/Others notified: No. Family/Friends available for support: Yes. Concern for family members at  home: Yes. Major illness in household: Yes. Financial concerns: Yes. TV/Computer concerns: Yes., 03/31/2016  Lives with Mother, Siblings., 09/23/2015  Nutrition/Health - Medium Risk, 06/17/2013  High protein High calorie, 10/11/2018  Sexual  Sexually active: No., 03/31/2016  Substance Abuse - Low Risk, 06/17/2013  Never, 03/31/2016  Tobacco - Low Risk, 06/17/2013  Never smoker, 03/31/2016  Family History  Asthma: Mother.  Eczema: Mother.  Immunizations  Vaccine Date Status Comments   influenza virus vaccine, inactivated 10/11/2018 Given Med Not Available  VFC unavailable at this time. ?Patient status required administration now per Dr. Campo   influenza virus vaccine, inactivated 10/19/2017 Given    influenza virus vaccine, inactivated 11/16/2016 Given    meningococcal conjugate vaccine 01/04/2016 Given    hepatitis A pediatric vaccine 01/04/2016 Given    hepatitis A pediatric vaccine 01/14/2015 Recorded    influenza virus vaccine, inactivated 11/06/2014 Recorded    influenza virus vaccine, inactivated 09/16/2013 Recorded    influenza virus vaccine, inactivated 10/12/2012 Recorded    influenza virus vaccine, inactivated 09/15/2011 Recorded    meningococcal conjugate vaccine 12/08/2010 Recorded    influenza virus vaccine, live, trivalent 11/17/2009 Recorded    varicella virus vaccine 03/24/2009 Recorded    influenza virus vaccine, live, trivalent 10/29/2008 Recorded    influenza virus vaccine, inactivated 10/23/2007 Recorded    influenza virus vaccine, inactivated 11/09/2006 Recorded    influenza virus vaccine, inactivated 12/12/2005 Recorded    influenza virus vaccine, inactivated 11/01/2004 Recorded    poliovirus vaccine, inactivated 11/25/2003 Recorded    measles/mumps/rubella virus vaccine 11/25/2003 Recorded    diphtheria/pertussis, acel/tetanus ped 11/25/2003 Recorded    pneumococcal 7-valent vaccine 11/25/2003 Recorded    diphtheria/pertussis, acel/tetanus ped 02/12/2001 Recorded    pneumococcal 7-valent  vaccine 02/12/2001 Recorded    varicella virus vaccine 02/12/2001 Recorded    poliovirus vaccine, inactivated 11/09/2000 Recorded    measles/mumps/rubella virus vaccine 11/09/2000 Recorded    diphtheria/pertussis, acel/tetanus ped 11/09/2000 Recorded    haemophilus b conjugate (PRP-T) vaccine 11/09/2000 Recorded    hepatitis B pediatric vaccine 08/23/2000 Recorded    hepatitis B pediatric vaccine 03/06/2000 Recorded    hepatitis B pediatric vaccine 01/03/2000 Recorded    Health Maintenance  Health Maintenance  ???Pending?(in the next year)  ??? ??OverDue  ??? ? ? ?Influenza Vaccine due??and every?  ??? ? ? ?Diabetes Maintenance-Fasting Lipid Profile due??11/30/14??and every 1??year(s)  ??? ??Due?  ??? ? ? ?ADL Screening due??10/29/18??and every 1??year(s)  ??? ? ? ?Alcohol Misuse Screening due??10/29/18??and every 1??year(s)  ??? ? ? ?Diabetes Maintenance-Microalbumin due??10/29/18??Variable frequency  ??? ? ? ?Diabetes Maintenance-Foot Exam due??10/29/18??and every?  ??? ? ? ?Smoking Cessation due??10/29/18??Variable frequency  ??? ? ? ?Tetanus Vaccine due??10/29/18??and every 10??year(s)  ??? ??Due In Future?  ??? ? ? ?Diabetes Maintenance-Eye Exam not due until??11/08/18??and every 1??year(s)  ??? ? ? ?Depression Screening not due until??09/17/19??and every 1??year(s)  ??? ? ? ?Diabetes Maintenance-Urine Dipstick not due until??09/17/19??and every 1??year(s)  ??? ? ? ?Blood Pressure Screening not due until??10/11/19??and every 1??year(s)  ??? ? ? ?Body Mass Index Check not due until??10/11/19??and every 1??year(s)  ??? ? ? ?Diabetes Maintenance-HgbA1c not due until??10/11/19??and every 1??year(s)  ??? ? ? ?Obesity Screening not due until??10/11/19??and every 1??year(s)  ??? ? ? ?Diabetes Maintenance-Serum Creatinine not due until??10/11/19??and every 1??year(s)  ???Satisfied?(in the past 1 year)  ??? ??Satisfied?  ??? ? ? ?Blood Pressure Screening on??10/11/18.??Satisfied by Mary Fisher LPN  ??? ? ? ?Body  Mass Index Check on??10/11/18.??Satisfied by SYSTEM  ??? ? ? ?Depression Screening on??09/17/18.??Satisfied by Princess Dick  ??? ? ? ?Diabetes Maintenance-Eye Exam on??11/08/17.??Satisfied by Renuka Bermeo  ??? ? ? ?Diabetes Maintenance-HgbA1c on??10/11/18.??Satisfied by Chelle Ndiaye P.  ??? ? ? ?Diabetes Maintenance-Serum Creatinine on??10/11/18.??Satisfied by Chelle Ndiaye P.  ??? ? ? ?Diabetes Maintenance-Urine Dipstick on??09/17/18.??Satisfied by Shakeel Field  ??? ? ? ?Diabetes Screening on??10/11/18.??Satisfied by Chelle Ndiaye P.  ??? ? ? ?Influenza Vaccine on??10/11/18.??Satisfied by Leyla Poe  ??? ? ? ?Obesity Screening on??10/11/18.??Satisfied by Mary Fisher LPN  ?  ?     [1]?Office Visit Note; Chucho Campo MD 09/17/2018 11:35 CDT  [2]?Office Visit Note; Chucho Campo MD 09/17/2018 11:35 CDT

## 2022-05-04 NOTE — HISTORICAL [facility] CERNER
This is a historical note converted from [employer]. Formatting and pictures may have been removed.  Please reference Na for original formatting and attached multimedia. History of Present Illness  Ms. 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 ?She was in her usual state of health until March 2018?when she started noting?nausea?that was unrelated to food intake.?This was followed by vomiting of food several days of the week. ?She denies any history of abdominal pain?or change in bowel habits. Notes indicated that her symptoms began around the time?she started OCPs for amenorrhea, but she has been off OCPs for months w/out resolution of her symptoms. She was hospitalized at [facility]?in April to May and had extensive workup performed by both gastroenterology and rheumatology.??Per notes she underwent a bone marrow biopsy,?upper and lower endoscopies, MRI abd w/ contrast (only appendiceal thickening), CT abd w/ contrast, Abd US, serologic testing?which revealed a negative OFELIA by [facility] direct assay, RF, ANCA, celiac.?She was noted to have elevated stool calprotectin levels x2 and again here. Unfortunately, a unifying underlying diagnosis?was not identified, but she has been?on prednisone?20mg/d. Later?Plaquenil and naproxen were added and prednisone is currently at 40mg/d. She can miss naproxen and doesnt notice much of a difference in joint symptoms. However, she does not miss prednisone, but cant say if she feels better on 40mg vs 20mg.  Review of Systems  Review of Systems  General: Denies chills and fever  Ophthalmologic: Denies discharge. ?Denies flashes of light in visual field  ENT: Denies decreased sense of smell. ?Denies  nosebleeds.?  Endocrine: Denies excessive sweating.+ weight loss  Respiratory:?Denies hemoptysis. ?Denies tuberculosis.  Cardiovascular:?Denies cyanosis. ?Denies rheumatic fever.  Gastrointestinal: Denies difficulty swallowing. ?Denies hematemesis.  Hematology: Denies bleeding problems.? Denies recent transfusion.  Peripheral vascular: Denies absent pulses in feet.? Denies ulceration of the feet  Skin: Denies blistering of skin. ?Denies skin cancer.  Neurologic denies seizures.? Denies stroke.  Psychiatric: Denies eating disorder. ?Denies loss of appetite.  Physical Exam  Vitals & Measurements  T:?36.5? ?C (Oral)? TMIN:?36.5? ?C (Oral)? TMAX:?37.3? ?C (Oral)? HR:?75(Peripheral)? RR:?18? BP:?104/66? SpO2:?100%?  General examination: cachetic  Head: Normocephalic, atraumatic.  Eyes: Conjunctiva clear, upper eyelids normal, lower eyelids normal.  Oral cavity: Mucosa moist, no lesions, fair dentition.  Throat: No exudate, no erythema.  Neck/thyroid: Neck supple, no lymphadenopathy, no thyroid nodules  Skin: No suspicious lesions, warm and dry.  Heart:?S1, S2 normal, regular rate and rhythm. ?No JVD.  Lungs: Clear to auscultation, no accessory muscle use.  Musculoskeletal:?No synovitis,?no deformities.  Extremities: No clubbing or cyanosis.  Neurologic: Alert and oriented, cranial nerves II through XII grossly intact.? Muscle strength grossly intact. ?Sensation grossly intact.  Psych:?Cognitive function intact, mood/affect full range.  ?  11/01/2018 13:45 CDT CT Thorax W Contrast)  1. No evidence for central or distal filling defect to suggest  pulmonary embolism.  2. Wedge-shaped area of consolidation within the left lung base  concerning for infectious/inflammatory process.  3. Improved aeration of the right lung base.  4. Persistent changes of cystic fibrosis with diffuse bronchiectasis  and mucoid impaction of the bronchials.  ?  ?  10/31/2018 13:07 CDT CT Angio Abd and Pelvis W/WO)  1. No evidence for vasculitis or  aortic pathology.  2. Wedge like consolidation within the left lung base without  associated air bronchograms. This is new compared to the prior  examination.  3. Changes of cystic fibrosis as described above. Please refer to  separate CT the thorax for further characterization. Except for. No  acute process of the abdomen or pelvis with moderate amount of  ascites.  Assessment/Plan  Ms. Luna is a 17 y/o female? 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 She currently has pneumonia on CT and is on broad spectrum abx.  ?   I ordered a host of labs via [employer] and they were cancelled and sent to [facility], which is not appropriate -?they are NOT equivalent labs. Discussed with nursing/labs as this is essential for defining her underlying illness. The primary team has been tapering her prednisone, currently at 10mg/d, given her DM. She seems to be tolerating the steroid taper well - would hold at 10mg/d. I would like to start Methotrexate SQ (given GI issues) once she is improved from an infectious standpoint. Will work on getting Rasuvo pen at 20mg/wk?from pharmacy. I did discuss the risks/benefits of MTX w/ pt, including bone marrow suppression, infectious risk, teratogenicity/abortant, liver irritation and hair loss. Will arrange short-interval?outpt f/u.  ?  ?   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  CF (cystic fibrosis)  Chronic disease-related malnutrition  CYSTIC FIBROSIS WITH GASTROINTESTINAL MANIFESTATIONS  Cystic fibrosis with pulmonary exacerbation  Diabetes mellitus related to cystic fibrosis  Diabetes mellitus related to cystic fibrosis  Encounter for adjustment or management of  vascular access device  Hypoalbuminemia  Polyarticular arthritis  Primary amenorrhea  Pseudomonas infection  Severe malnutrition  Vomiting  Weight loss  Historical  No qualifying data  Procedure/Surgical History  MEDIPORT REMOVAL AND RE-PLACEMENT (05/15/2018)  Mediport placement  Mediport removal   Medications  Inpatient  acetaminophen 325 mg oral tablet, 650 mg= 2 tab(s), Oral, q6hr, PRN  albuterol 0.083% inhalation solution, 2.5 mg= 3 mL, NEB, q6hr, PRN  azithromycin 250 mg oral tablet, 500 mg= 2 tab(s), Oral, BID MWF  Creon 24,000 units oral delayed release capsule, 5 capsules, Oral, TID  cyproheptadine 4 mg oral tablet, 2 mg= 0.5 tab(s), Oral, BID  Dextrose 50% and Water (50 mL vial/syringe), 12.5 gm= 25 mL, IV Push, As Directed, PRN  Dextrose 50% and Water (50 mL vial/syringe), 12.5 gm= 25 mL, IV Push, Once, PRN  Dextrose 50% and Water (50 mL vial/syringe), 25 gm= 50 mL, IV Push, As Directed, PRN  Dextrose 50% in Water intravenous solution, 12.5 gm= 25 mL, IV Push, As Directed, PRN  glucagon recombinant 1 mg injection, 1 mg= 1 EA, IM, q10min, PRN  glucagon recombinant 1 mg injection, 1 mg= 1 EA, IM, q10min, PRN  glucose 40% oral gel, 15 gm= 0.5 tube(s), Oral, As Directed, PRN  Hyper-Sal 7% inhalation solution, 1 neb, INH, BID  ibuprofen 400 mg oral tablet, 400 mg= 1 tab(s), Oral, q6hr, PRN  insulin glargine 100 U/mL (per pen), 20 units= 0.2 mL, Subcutaneous, BID  insulin lispro 100 units/mL subcutaneous injection, 2-14 units, Subcutaneous, As Directed, PRN  IVF Normal Saline NS Infusion 1,000 mL, 1000 mL, IV  Lovenox 40mg Inj, 40 mg= 0.4 mL, Subcutaneous, BID  Megace, 20 mg= 0.5 tab(s), Oral, Daily  montelukast 5 mg oral tablet, CHEWABLE, 10 mg= 2 tab(s), Oral, Daily  Nasal Saline 0.65% solution, 2 drop(s), Nasal, q2hr  Normal Saline (0.9% NS)  mL, 500 mL, IV  Plaquenil 200 mg oral tablet, 200 mg= 1 tab(s), Oral, BID  prednisONE 10 mg oral tablet, 10 mg= 1 tab(s), Oral, Daily  Protonix 20 mg ORAL enteric  coated tablet, 40 mg= 1 tab(s), Oral, Daily  Pulmozyme 2.5 mg/2.5 mL inhalation solution, 2.5mg, 2.5ml, NEB, Daily  Tobramycin Inhalation solution 300 mg/4ml, 300 mg= 4 mL, NEB, q12hr  vancomycin, 750 mg= 150 mL, IV Piggyback, q12hr  Zofran 2 mg/mL injectable solution, 4 mg= 2 mL, IV Push, q4hr, PRN  Zofran 2 mg/mL injectable solution, 8 mg= 4 mL, IV Push, q4hr, PRN  Zosyn 3 g-0.375 g/50 mL intravenous solution, 3.375 gm= 50 mL, IV Piggyback, q8hr  Home  Accu-Chek AvivaPlus test strips, See Instructions, 5 refills  AquADEKs oral tablet, chewable, 1 tab(s), Oral, Daily, 6 refills  azithromycin 500 mg oral tablet, See Instructions, 1 refills  Bethkis 75 mg/mL inhalation solution, 300 mg= 4 mL, NEB, BID, 5 refills  Creon 24,000 units oral delayed release capsule, See Instructions, 5 refills  cyproheptadine 4 mg oral tablet, 2 mg= 0.5 tab(s), Oral, BID, 5 refills  HUMALOG 100 UNIT/ML SOLN  Hyper-Sal 7% inhalation solution, 1 neb, INH, BID, 6 refills  Mediport flush, See Instructions, 11 refills  montelukast 10 mg oral TABLET, 10 mg= 1 tab(s), Oral, Daily, 5 refills  naproxen 375 mg oral delayed release tablet, 375 mg= 1 tab(s), Oral, BID, 3 refills  Nasal Saline 0.65% solution, 2 drop(s), Nasal, q2hr, 11 refills  One Touch Delica 31 guage lancets, See Instructions, 5 refills  Plaquenil 200 mg oral tablet, 200 mg= 1 tab(s), Oral, BID, 5 refills  prednisONE 10 mg oral tablet, 20 mg= 2 tab(s), Oral, Daily, 1 refills  ProAir HFA 90 mcg/inh inhalation aerosol with adapter, 2 puff(s), INH, BID, 6 refills  Pulmozyme 2.5 mg/2.5 mL inhalation solution, 2.5 mg= 2.5 mL, NEB, Daily, 6 refills  sure comfort 31G Pen Needles, See Instructions, 5 refills  Toujeo SoloStar 300 units/mL subcutaneous solution, 16 units, Subcutaneous, Daily, 5 refills  Allergies  No Known Allergies  Social History  Alcohol - Low Risk, 06/17/2013  Never, 03/31/2016  Employment/School - Medium Risk, 06/17/2013  Student, 10/11/2018  Student, Work/School  description: 12th grade. Homeschooled., 09/17/2018  Student, 06/05/2018  Student, Work/School description: 11th grade., 10/19/2017  Student, Work/School description: 10 th grade at [employer] 11/16/2016  Student, 09/23/2015  Exercise - Regular exercise, 06/03/2015  Exercise frequency: Daily., 03/31/2016  Home/Environment - Low Risk, 06/17/2013  Lives with Mother, Siblings. Alcohol abuse in household: No. Substance abuse in household: No. Smoker in household: No. Injuries/Abuse/Neglect in household: No. Feels unsafe at home: No. Safe place to go: Yes. Agency(s)/Others notified: No. Family/Friends available for support: Yes. Concern for family members at home: Yes. Major illness in household: Yes. Financial concerns: Yes. TV/Computer concerns: Yes., 03/31/2016  Lives with Mother, Siblings., 09/23/2015  Nutrition/Health - Medium Risk, 06/17/2013  High protein High calorie, 10/11/2018  Sexual  Sexually active: No., 03/31/2016  Substance Abuse - Low Risk, 06/17/2013  Never, 03/31/2016  Tobacco - Low Risk, 06/17/2013  Never smoker, N/A, Household tobacco concerns: No., 10/29/2018  Never smoker, 03/31/2016  Family History  Asthma: Mother.  Eczema: Mother.  Immunizations  Vaccine Date Status Comments   influenza virus vaccine, inactivated 10/11/2018 Given Med Not Available  VFC unavailable at this time. ?Patient status required administration now per Dr. Campo   influenza virus vaccine, inactivated 10/19/2017 Given    influenza virus vaccine, inactivated 11/16/2016 Given    meningococcal conjugate vaccine 01/04/2016 Given    hepatitis A pediatric vaccine 01/04/2016 Given    hepatitis A pediatric vaccine 01/14/2015 Recorded    influenza virus vaccine, inactivated 11/06/2014 Recorded    influenza virus vaccine, inactivated 09/16/2013 Recorded    influenza virus vaccine, inactivated 10/12/2012 Recorded    influenza virus vaccine, inactivated 09/15/2011 Recorded    meningococcal conjugate vaccine 12/08/2010 Recorded     influenza virus vaccine, live, trivalent 11/17/2009 Recorded    varicella virus vaccine 03/24/2009 Recorded    influenza virus vaccine, live, trivalent 10/29/2008 Recorded    influenza virus vaccine, inactivated 10/23/2007 Recorded    influenza virus vaccine, inactivated 11/09/2006 Recorded    influenza virus vaccine, inactivated 12/12/2005 Recorded    influenza virus vaccine, inactivated 11/01/2004 Recorded    poliovirus vaccine, inactivated 11/25/2003 Recorded    measles/mumps/rubella virus vaccine 11/25/2003 Recorded    diphtheria/pertussis, acel/tetanus ped 11/25/2003 Recorded    pneumococcal 7-valent vaccine 11/25/2003 Recorded    diphtheria/pertussis, acel/tetanus ped 02/12/2001 Recorded    pneumococcal 7-valent vaccine 02/12/2001 Recorded    varicella virus vaccine 02/12/2001 Recorded    poliovirus vaccine, inactivated 11/09/2000 Recorded    measles/mumps/rubella virus vaccine 11/09/2000 Recorded    diphtheria/pertussis, acel/tetanus ped 11/09/2000 Recorded    haemophilus b conjugate (PRP-T) vaccine 11/09/2000 Recorded    hepatitis B pediatric vaccine 08/23/2000 Recorded    hepatitis B pediatric vaccine 03/06/2000 Recorded    hepatitis B pediatric vaccine 01/03/2000 Recorded

## 2022-05-04 NOTE — HISTORICAL [facility]ERNER
This is a historical note converted from Na. Formatting and pictures may have been removed.  Please reference Na for original formatting and attached multimedia. Chief Complaint  j tube leaking  History of Present Illness  20 y.o female with?CF, malnourishment, anaemia of chronic disease and RA presents for evaluation of J-tube leak. She noticed the tube was pulled out further than usual 2 weeks ago and started leaking shortly after. This prompted her to visit the ED. ED was unable to provide adequate treatment due to lack of equipment. They placed tape over the tube to prevent further movement of the tube and discharged the pt. This morning, the pt noted worsening of leaking while feeding. Pt has unable to get adequate feeding via tube for 2 weeks.  ?  Pt denies any N/V, fever, chills, chest pain or urinary/bowel incontinence  Review of Systems  12 point?ros negative except for mentioned above  Physical Exam  Vitals & Measurements  T:?36.8? ?C (Oral)? HR:?136(Peripheral)? BP:?102/71? SpO2:?98%?  HT:?155.00?cm? WT:?29.500?kg? BMI:?12.28?  General:?well-developed, malnourished  Respiratory:?b/l diffuse crackles on auscultation  Cardiovascular:?regular rate and rhythm  Gastrointestinal:?soft, non-tender, non-distended , without masses to palpation  Musculoskeletal:?weakness in b/l upper and lower extremities due to RA and osteopetrosis  Integumentary: J - tube leaking with deflated balloon  Neurologic: cranial nerves intact grossly , no signs of peripheral neurological deficit  ?  Assessment/Plan  20 y.o female with?CF, malnourishment, anaemia of chronic disease and RA presents for evaluation of J-tube leak  ?   - J-tube study ordered today. will f/u in clinic after results posted ?  ?  ?   PGY5 Addendum  ?   As above.  S.p G tube in 2018, followed by what appears to be a conversion to GJ tube at [facility]. Doing well until two weeks ago when she?started experiencing worsening leakage around tube. flushing does  not hurt, but unable to use the tube due to leakage.? No other complaints.  ?   Abdomen soft, ND, NT, enteric tube sute clean/dry without infection. 18F tube protruding. Pain with manipulation of the tube.  ?   Tube check study:  Findings:  Contrast opacifies the stomach suggesting appropriately positioned  gastric tube.  ?  Impression:  Contrast opacifies the stomach suggesting appropriately positioned  gastric tube.  ?  ?  Plan:  G/J tube malfunction/leakage  Discussed with Dr Flores: she will follow up with the patient and schedule EGD assisted replacement  RTC to surgery clinic PRN  ?  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  Encounter for adjustment or management of vascular access device  Gastrostomy status  High risk medication use  Hypoalbuminemia  Impaired mobility  Pressure sore  Primary amenorrhea  Protein calorie malnutrition  Proteinuria  Rheumatoid arthritis  Vomiting  Weight loss  Historical  Cystic fibrosis with pulmonary exacerbation  Pseudomonas infection  Procedure/Surgical History  NEW PEG TUBE INSERTED (05/06/2019)  Esophagogastroduodenoscopy, flexible, transoral; with biopsy, single or multiple (11/05/2018)  Esophagogastroduodenoscopy, flexible, transoral; with directed placement of percutaneous gastrostomy tube (11/05/2018)  Excision of Duodenum, Via Natural or Artificial Opening Endoscopic, Diagnostic (11/05/2018)  Insertion of Feeding Device into Stomach, Percutaneous Approach (11/05/2018)  PEG Tube Insertion Initial (11/05/2018)  MEDIPORT REMOVAL AND RE-PLACEMENT (05/15/2018)  Venous catheterization, not elsewhere classified (11/23/2013)  Venous catheterization, not elsewhere classified (06/17/2013)  Mediport  placement  Mediport removal  NEW PEG TUBE INSERTED , PEG BUTTON  OLD PEG TUBE REMOVED   Medications  albuterol 90 mcg/inh inhalation aerosol, See Instructions  albuterol 90 mcg/inh inhalation aerosol, See Instructions  Amoxil 500 mg Cap, 500 mg= 1 cap(s), Oral, TID,? ?Not Taking, Completed Rx  AquADEKs oral tablet, chewable, 1 tab(s), Oral, Daily, 6 refills  azithromycin 500 mg oral tablet, See Instructions, 5 refills,? ?Not Taking, Completed Rx  Bethkis 75 mg/mL inhalation solution, 300 mg= 4 mL, NEB, BID  Creon 24,000 units oral delayed release capsule, See Instructions, 5 refills  cyproheptadine 4 mg oral tablet, 2 mg= 0.5 tab(s), Oral, BID, 6 refills  cyproheptadine 4 mg oral tablet, 2 mg= 0.5 tab(s), Oral, BID, 5 refills  denosumab 60 mg/mL subcutaneous solution, 60 mg= 1 mL, Subcutaneous, q6mo, 1 refills  ergocalciferol 50,000 intl units (1.25 mg) oral capsule, See Instructions  ferrous sulfate 220 mg/5 mL (44 mg elemental iron) oral elixir, 440 mg= 10 mL, Oral, Daily, 4 refills  folic acid 1 mg oral tablet, 1 mg= 1 tab(s), Oral, Daily, 5 refills  HumaLOG KwikPen 100 units/mL injectable solution, 14 units, Subcutaneous, TIDAC, 5 refills  hydroxychloroquine 200 mg oral tablet, See Instructions, 5 refills  Hyper-Sal 7% inhalation solution, 1 neb, INH, BID, 6 refills  Kineret 100 mg/0.67 mL subcutaneous solution, 100 mg, Subcutaneous, Daily, 3 refills,? ?Not taking  montelukast 10 mg oral TABLET, 10 mg= 1 tab(s), Oral, Daily, 5 refills  naproxen 375 mg oral tablet, 375 mg= 1 tab(s), Oral, BID  ondansetron 4 mg oral tablet, disintegrating, 4 mg= 1 tab(s), Oral, q8hr, PRN, 1 refills,? ?Not taking  physical therapy, See Instructions  Pulmozyme 2.5 mg/2.5 mL inhalation solution, 2.5 mg= 2.5 mL, NEB, Daily, 6 refills  Rasuvo 25 mg/0.5 mL subcutaneous solution, 25 mg, Subcutaneous, qWeek, 3 refills  Toujeo Max SoloStar 300 units/mL subcutaneous solution, 20 units, Subcutaneous, BID, 10 refills  Trikafta oral tablet,  100/50.75/150 MG, Oral, q12hr  Allergies  No Known Allergies  Social History  Abuse/Neglect  Yes, No, Yes, 11/24/2020  No, No, Yes, 11/13/2020  No, 11/08/2020  No, No, Yes, 09/11/2020  Alcohol - Low Risk, 06/17/2013  Never, 11/24/2020  Never, 11/08/2020  Never, 03/31/2016  Employment/School - Medium Risk, 06/17/2013  Disabled, 09/11/2020  Exercise - Regular exercise, 06/03/2015  Home/Environment - Low Risk, 06/17/2013  Lives with Mother, Siblings. Alcohol abuse in household: No. Substance abuse in household: No. Smoker in household: No. Injuries/Abuse/Neglect in household: No. Feels unsafe at home: No. Safe place to go: Yes. Agency(s)/Others notified: No. Family/Friends available for support: Yes. Concern for family members at home: Yes. Major illness in household: Yes. Financial concerns: Yes. TV/Computer concerns: Yes., 03/31/2016  Nutrition/Health - Medium Risk, 06/17/2013  Tube feeding, Diet restrictions: glucerna 1.5cal + 15ml H2O at 85ml/hr., 12/09/2019  Sexual  Sexual orientation: Straight or heterosexual. Gender Identity Identifies as female., 02/28/2019  Sexually active: No., 03/31/2016  Spiritual/Cultural  Latter-day, No, 07/11/2019  Substance Use - Low Risk, 06/17/2013  Never, 11/24/2020  Never, 11/08/2020  Never, 03/31/2016  Tobacco - Low Risk, 06/17/2013  Never (less than 100 in lifetime), N/A, 11/24/2020  Never (less than 100 in lifetime), N/A, 11/13/2020  Never (less than 100 in lifetime), N/A, 11/08/2020  Never (less than 100 in lifetime), N/A, 09/11/2020  Family History  Asthma: Mother.  Eczema: Mother.  Immunizations  Vaccine Date Status Comments   meningococcal group B vaccine 11/13/2020 Given    influenza virus vaccine, inactivated 11/13/2020 Given    pneumococcal 13-valent conjugate vaccine 11/26/2019 Given    influenza virus vaccine, inactivated 10/01/2019 Given    influenza virus vaccine, inactivated 10/11/2018 Given Med Not Available  VFC unavailable at this time. ?Patient status required  administration now per Dr. Campo   influenza virus vaccine, inactivated 10/19/2017 Given    influenza virus vaccine, inactivated 11/16/2016 Given    meningococcal conjugate vaccine 01/04/2016 Given    hepatitis A pediatric vaccine 01/04/2016 Given    hepatitis A pediatric vaccine 01/14/2015 Recorded    influenza virus vaccine, inactivated 11/06/2014 Recorded    influenza virus vaccine, inactivated 09/16/2013 Recorded    influenza virus vaccine, inactivated 10/12/2012 Recorded    human papillomavirus vaccine 11/30/2011 Recorded    influenza virus vaccine, inactivated 09/15/2011 Recorded    human papillomavirus vaccine 05/16/2011 Recorded    human papillomavirus vaccine 03/02/2011 Recorded    tetanus/diphtheria/pertussis, acel(Tdap) 12/08/2010 Recorded    meningococcal conjugate vaccine 12/08/2010 Recorded    influenza virus vaccine, live, trivalent 11/17/2009 Recorded    varicella virus vaccine 03/24/2009 Recorded    influenza virus vaccine, live, trivalent 10/29/2008 Recorded    influenza virus vaccine, inactivated 10/23/2007 Recorded    influenza virus vaccine, inactivated 11/09/2006 Recorded    influenza virus vaccine, inactivated 12/12/2005 Recorded    influenza virus vaccine, inactivated 11/01/2004 Recorded    poliovirus vaccine, inactivated 11/25/2003 Recorded    pneumococcal 7-valent vaccine 11/25/2003 Recorded    measles/mumps/rubella virus vaccine 11/25/2003 Recorded    diphtheria/pertussis, acel/tetanus ped 11/25/2003 Recorded    varicella virus vaccine 02/12/2001 Recorded    pneumococcal 7-valent vaccine 02/12/2001 Recorded    diphtheria/pertussis, acel/tetanus ped 02/12/2001 Recorded    poliovirus vaccine, inactivated 11/09/2000 Recorded    measles/mumps/rubella virus vaccine 11/09/2000 Recorded    haemophilus b conjugate (PRP-T) vaccine 11/09/2000 Recorded    diphtheria/pertussis, acel/tetanus ped 11/09/2000 Recorded    hepatitis B pediatric vaccine 08/23/2000 Recorded    poliovirus vaccine, live,  trivalent 03/06/2000 Recorded    hepatitis B pediatric vaccine 03/06/2000 Recorded    poliovirus vaccine, live, trivalent 01/03/2000 Recorded    hepatitis B pediatric vaccine 01/03/2000 Recorded    Health Maintenance  Health Maintenance  ???Pending?(in the next year)  ??? ??OverDue  ??? ? ? ?Diabetes Maintenance-Eye Exam due??11/08/19??and every 2??year(s)  ??? ? ? ?Alcohol Misuse Screening due??01/02/20??and every 1??year(s)  ??? ??Due?  ??? ? ? ?Cervical Cancer Screening due??11/24/20??Unknown Frequency  ??? ? ? ?Diabetes Maintenance-Foot Exam due??11/24/20??Unknown Frequency  ??? ??Due In Future?  ??? ? ? ?Tetanus Vaccine not due until??12/08/20??and every 10??year(s)  ??? ? ? ?Obesity Screening not due until??01/01/21??and every 1??year(s)  ??? ? ? ?ADL Screening not due until??06/25/21??and every 1??year(s)  ??? ? ? ?Influenza Vaccine not due until??10/01/21??and every 1??day(s)  ??? ? ? ?Depression Screening not due until??11/13/21??and every 1??year(s)  ??? ? ? ?Diabetes Maintenance-HgbA1c not due until??11/13/21??and every 1??year(s)  ???Satisfied?(in the past 1 year)  ??? ??Satisfied?  ??? ? ? ?ADL Screening on??06/25/20.??Satisfied by Bhavani Vinson LPN  ??? ? ? ?Blood Pressure Screening on??11/24/20.??Satisfied by Stacey Fay  ??? ? ? ?Body Mass Index Check on??11/24/20.??Satisfied by Stacey Fay  ??? ? ? ?Depression Screening on??11/13/20.??Satisfied by Shannon Gomes LPN  ??? ? ? ?Diabetes Maintenance-HgbA1c on??11/13/20.??Satisfied by Cody Serna  ??? ? ? ?Diabetes Screening on??11/13/20.??Satisfied by Cody Serna  ??? ? ? ?Influenza Vaccine on??11/13/20.??Satisfied by Leyla Poe  ??? ? ? ?Obesity Screening on??11/24/20.??Satisfied by Stacey Fay  ?      I agree with resident documentation. I was physically present, supervised resident, ?and discussed plan of care.  refer to GI for endoscopic repositioning of j tube

## 2022-05-04 NOTE — HISTORICAL [facility] CERNER
This is a historical note converted from [employer]. Formatting and pictures may have been removed.  Please reference [employer] for original formatting and attached multimedia. History of Present Illness  Ms. 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 ?She was in her usual state of health until March 2018?when she started noting?nausea?that was unrelated to food intake.?This was followed by vomiting of food several days of the week. ?She denies any history of abdominal pain?or change in bowel habits. Notes indicated that her symptoms began around the time?she started OCPs for amenorrhea, but she has been off OCPs for months w/out resolution of her symptoms. She was hospitalized at [facility]?in April to May and had extensive workup performed by both gastroenterology and rheumatology.??Per notes she underwent a bone marrow biopsy,?upper and lower endoscopies, MRI abd w/ contrast (only appendiceal thickening), CT abd w/ contrast, Abd US, serologic testing?which revealed a negative OFELIA by [facility] direct assay, RF, ANCA, celiac.?She was noted to have elevated stool calprotectin levels x2 and again here. Unfortunately, a unifying underlying diagnosis?was not identified, but she was started on prednisone?20mg/d. Later?Plaquenil and naproxen were added and prednisone is currently at 40mg/d. She can miss naproxen and doesnt notice much of a difference in joint symptoms. However, she does not miss prednisone, but cant say if she feels better on 40mg vs 20mg.  ?   Her joint pain is ache and constant - worst in wrists. She has morning stiffness (cant quantify)?that improves w/ movement and gelling with inactivity.?She denies fevers, chills, night  sweats, hair loss, oral ulcers, rashes, leukopenia/thrombocytopenia, serositis, seizures, raynauds, hx of eye inflammation.  ?  Review of Systems  General: Denies chills and fever  Ophthalmologic: Denies discharge. ?Denies flashes of light in visual field  ENT: Denies decreased sense of smell. ?Denies nosebleeds.?  Endocrine: Denies excessive sweating.+ weight loss  Respiratory:?Denies hemoptysis. ?Denies tuberculosis.  Cardiovascular:?Denies cyanosis. ?Denies rheumatic fever.  Gastrointestinal: Denies difficulty swallowing. ?Denies hematemesis.  Hematology: Denies bleeding problems.? Denies recent transfusion.  Peripheral vascular: Denies absent pulses in feet.? Denies ulceration of the feet  Skin: Denies blistering of skin. ?Denies skin cancer.  Neurologic denies seizures.? Denies stroke.  Psychiatric: Denies eating disorder. ?Denies loss of appetite.  ?  ?  Physical Exam  Vitals & Measurements  T:?36.9? ?C (Oral)? TMIN:?36.6? ?C (Oral)? TMAX:?37.0? ?C (Oral)? HR:?78(Peripheral)? RR:?20? BP:?106/69? SpO2:?95%? WT:?31.3?kg?  General examination: cachetic  Head: Normocephalic, atraumatic.  Eyes: Conjunctiva clear, upper eyelids normal, lower eyelids normal.  Oral cavity: Mucosa moist, no lesions, fair dentition.  Throat: No exudate, no erythema.  Neck/thyroid: Neck supple, no lymphadenopathy, no thyroid nodules  Skin: No suspicious lesions, warm and dry.  Heart:?S1, S2 normal, regular rate and rhythm. ?No JVD.  Lungs: Clear to auscultation, no accessory muscle use.  Musculoskeletal:?No synovitis,?no deformities.  Extremities: No clubbing or cyanosis.  Neurologic: Alert and oriented, cranial nerves II through XII grossly intact.? Muscle strength grossly intact. ?Sensation grossly intact.  Psych:?Cognitive function intact, mood/affect full range.  ?   Reviewed records, labs, imaging in [facility]. Reviewed extensive records from [facility]  ?   OFELIA direct assay neg, SSA/SSB neg, RNP neg, Sm neg, DsDNA neg, RF  neg  Assessment/Plan  Ms. 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  ?   I am concerned for an AI etiology for her symptoms, as she certainly has impressive synovitis on exam, even?on 40mg/d prednisone, and very elevated inflammatory markers.?She did have some rheum labs at [facility], but I have ordered full AI w/ [facility], as I have concerns w/ the reliability of [facility].?There are some associations w/ CF arthritis in the literature (episodic arthritis, hypertrophic pulmonary osteoarthritis), but they seem to be much less fulminant and more responsive to NSAIDs. She appears to have a constant,?inflammatory arthritis w/ symmetric?wrist, elbow and knee involvement?- there doesnt seem to be the typical small hand/foot involvement of RA, but it is possibly masked by steroid use. Interestingly, the CF-arthritis does note an association w/ Creon and I did see that GI recommended decreased Creon w/ her weight loss. I have?made this adjustment and discussed w/ pt. Another more alarming consideration would be vasculitis given her severe weight loss.?Her Scr is normal, but she did have proteinuria on?previous eval.? I have ordered 24 hour study for further evaluation. 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 Moreover, it seems unlikely that her GI symptoms / weight loss are not related to?the same process that is affecting her joints. Although she has had an extensive eval, she has not had eval of the vasculature, so I will complete a?CTA?abd.?I have asked nursing to do a calorie count - her nurse reports that she is eating pretty well, which is alarming for a severely  catabolic process given her cachexia.  ?   I will continue to follow w/ you.  ?   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  CF (cystic fibrosis)  Chronic disease-related malnutrition  CYSTIC FIBROSIS WITH GASTROINTESTINAL MANIFESTATIONS  Cystic fibrosis with pulmonary exacerbation  Diabetes mellitus related to cystic fibrosis  Diabetes mellitus related to cystic fibrosis  Encounter for adjustment or management of vascular access device  Hypoalbuminemia  Polyarticular arthritis  Primary amenorrhea  Pseudomonas infection  Severe malnutrition  Vomiting  Weight loss  Historical  No qualifying data  Procedure/Surgical History  MEDIPORT REMOVAL AND RE-PLACEMENT (05/15/2018)  Venous catheterization, not elsewhere classified (11/23/2013)  Venous catheterization, not elsewhere classified (06/17/2013)  Mediport placement  Mediport removal   Medications  Inpatient  acetaminophen 325 mg oral tablet, 650 mg= 2 tab(s), Oral, q6hr, PRN  albuterol 0.083% inhalation solution, 2.5 mg= 3 mL, NEB, q6hr, PRN  azithromycin 250 mg oral tablet, 500 mg= 2 tab(s), Oral, BID MWF  Creon 24,000 units oral delayed release capsule, See Instructions, Oral, TID  cyproheptadine 4 mg oral tablet, 2 mg= 0.5 tab(s), Oral, BID  Dextrose 50% and Water (50 mL vial/syringe), 12.5 gm= 25 mL, IV Push, As Directed, PRN  Dextrose 50% and Water (50 mL vial/syringe), 12.5 gm= 25 mL, IV Push, Once, PRN  Dextrose 50% and Water (50 mL vial/syringe), 25 gm= 50 mL, IV Push, As Directed, PRN  Dextrose 50% in Water intravenous solution, 12.5 gm= 25 mL, IV Push, As Directed, PRN  glucagon recombinant 1 mg injection, 1 mg= 1 EA, IM, q10min, PRN  glucagon recombinant 1 mg injection, 1 mg= 1 EA, IM, q10min, PRN  glucose 40% oral gel, 15 gm= 0.5 tube(s), Oral, As Directed, PRN  Hyper-Sal 7% inhalation solution, 1 neb, INH, BID  ibuprofen 400 mg oral tablet, 400 mg= 1 tab(s), Oral,  q6hr, PRN  insulin lispro 100 units/mL subcutaneous injection, 2-14 units, Subcutaneous, As Directed, PRN  IVF Normal Saline NS Bolus 1000ml 1,000 mL, 1000 mL, IV  IVF Normal Saline NS Infusion 1,000 mL, 1000 mL, IV  Lantus 100 U/mL (per pen), 16 units= 0.16 mL, Subcutaneous, At Bedtime  Megace, 20 mg= 0.5 tab(s), Oral, Daily  montelukast 5 mg oral tablet, CHEWABLE, 10 mg= 2 tab(s), Oral, Daily  Nasal Saline 0.65% solution, 2 drop(s), Nasal, q2hr  Plaquenil 200 mg oral tablet, 200 mg= 1 tab(s), Oral, BID  prednisONE 10 mg oral tablet, 20 mg= 1 tab(s), Oral, Daily  Protonix 20 mg ORAL enteric coated tablet, 40 mg= 1 tab(s), Oral, Daily  Pulmozyme 2.5 mg/2.5 mL inhalation solution, 2.5mg, 2.5ml, NEB, Daily  Tobramycin Inhalation solution 300 mg/4ml, 300 mg= 4 mL, NEB, q12hr  Vancocin 500 mg/ mL, 500 mg= 100 mL, IV Piggyback, q18hr  Zofran 2 mg/mL injectable solution, 4 mg= 2 mL, IV Push, q4hr, PRN  Zofran 2 mg/mL injectable solution, 8 mg= 4 mL, IV Push, q4hr, PRN  Zosyn 3 g-0.375 g/50 mL intravenous solution, 3.375 gm= 50 mL, IV Piggyback, q8hr  Home  Accu-Chek AvivaPlus test strips, See Instructions, 5 refills  AquADEKs oral tablet, chewable, 1 tab(s), Oral, Daily, 6 refills  azithromycin 500 mg oral tablet, See Instructions, 1 refills  Bethkis 75 mg/mL inhalation solution, 300 mg= 4 mL, NEB, BID, 5 refills  Creon 24,000 units oral delayed release capsule, See Instructions, 5 refills  cyproheptadine 4 mg oral tablet, 2 mg= 0.5 tab(s), Oral, BID, 5 refills  HUMALOG 100 UNIT/ML SOLN  Hyper-Sal 7% inhalation solution, 1 neb, INH, BID, 6 refills  Mediport flush, See Instructions, 11 refills  montelukast 10 mg oral TABLET, 10 mg= 1 tab(s), Oral, Daily, 5 refills  naproxen 375 mg oral delayed release tablet, 375 mg= 1 tab(s), Oral, BID, 3 refills  Nasal Saline 0.65% solution, 2 drop(s), Nasal, q2hr, 11 refills  One Touch Delica 31 guage lancets, See Instructions, 5 refills  Plaquenil 200 mg oral tablet, 200 mg= 1  tab(s), Oral, BID, 5 refills  prednisONE 10 mg oral tablet, 20 mg= 2 tab(s), Oral, Daily, 1 refills  ProAir HFA 90 mcg/inh inhalation aerosol with adapter, 2 puff(s), INH, BID, 6 refills  Pulmozyme 2.5 mg/2.5 mL inhalation solution, 2.5 mg= 2.5 mL, NEB, Daily, 6 refills  sure comfort 31G Pen Needles, See Instructions, 5 refills  Toujeo SoloStar 300 units/mL subcutaneous solution, 16 units, Subcutaneous, Daily, 5 refills  Allergies  No Known Allergies  Social History  Alcohol - Low Risk, 06/17/2013  Never, 03/31/2016  Employment/School - Medium Risk, 06/17/2013  Student, 10/11/2018  Student, Work/School description: 12th grade. Homeschooled., 09/17/2018  Student, 06/05/2018  Student, Work/School description: 11th grade., 10/19/2017  Student, Work/School description: 10 th grade at [employer] 11/16/2016  Student, 09/23/2015  Exercise - Regular exercise, 06/03/2015  Exercise frequency: Daily., 03/31/2016  Home/Environment - Low Risk, 06/17/2013  Lives with Mother, Siblings. Alcohol abuse in household: No. Substance abuse in household: No. Smoker in household: No. Injuries/Abuse/Neglect in household: No. Feels unsafe at home: No. Safe place to go: Yes. Agency(s)/Others notified: No. Family/Friends available for support: Yes. Concern for family members at home: Yes. Major illness in household: Yes. Financial concerns: Yes. TV/Computer concerns: Yes., 03/31/2016  Lives with Mother, Siblings., 09/23/2015  Nutrition/Health - Medium Risk, 06/17/2013  High protein High calorie, 10/11/2018  Sexual  Sexually active: No., 03/31/2016  Substance Abuse - Low Risk, 06/17/2013  Never, 03/31/2016  Tobacco - Low Risk, 06/17/2013  Never smoker, N/A, Household tobacco concerns: No., 10/29/2018  Never smoker, 03/31/2016  Family History  Asthma: Mother.  Eczema: Mother.  Immunizations  Vaccine Date Status Comments   influenza virus vaccine, inactivated 10/11/2018 Given Med Not Available  VFC unavailable at this time. ?Patient status  required administration now per Dr. Campo   influenza virus vaccine, inactivated 10/19/2017 Given    influenza virus vaccine, inactivated 11/16/2016 Given    meningococcal conjugate vaccine 01/04/2016 Given    hepatitis A pediatric vaccine 01/04/2016 Given    hepatitis A pediatric vaccine 01/14/2015 Recorded    influenza virus vaccine, inactivated 11/06/2014 Recorded    influenza virus vaccine, inactivated 09/16/2013 Recorded    influenza virus vaccine, inactivated 10/12/2012 Recorded    influenza virus vaccine, inactivated 09/15/2011 Recorded    meningococcal conjugate vaccine 12/08/2010 Recorded    influenza virus vaccine, live, trivalent 11/17/2009 Recorded    varicella virus vaccine 03/24/2009 Recorded    influenza virus vaccine, live, trivalent 10/29/2008 Recorded    influenza virus vaccine, inactivated 10/23/2007 Recorded    influenza virus vaccine, inactivated 11/09/2006 Recorded    influenza virus vaccine, inactivated 12/12/2005 Recorded    influenza virus vaccine, inactivated 11/01/2004 Recorded    poliovirus vaccine, inactivated 11/25/2003 Recorded    measles/mumps/rubella virus vaccine 11/25/2003 Recorded    diphtheria/pertussis, acel/tetanus ped 11/25/2003 Recorded    pneumococcal 7-valent vaccine 11/25/2003 Recorded    diphtheria/pertussis, acel/tetanus ped 02/12/2001 Recorded    pneumococcal 7-valent vaccine 02/12/2001 Recorded    varicella virus vaccine 02/12/2001 Recorded    poliovirus vaccine, inactivated 11/09/2000 Recorded    measles/mumps/rubella virus vaccine 11/09/2000 Recorded    diphtheria/pertussis, acel/tetanus ped 11/09/2000 Recorded    haemophilus b conjugate (PRP-T) vaccine 11/09/2000 Recorded    hepatitis B pediatric vaccine 08/23/2000 Recorded    hepatitis B pediatric vaccine 03/06/2000 Recorded    hepatitis B pediatric vaccine 01/03/2000 Recorded

## 2022-05-04 NOTE — HISTORICAL [facility] CERNER
This is a historical note converted from Na. Formatting and pictures may have been removed.  Please reference Na for original formatting and attached multimedia. Reason for Consultation  malnutrition, peg tube  History of Present Illness  Abhay is a 17 year old female AAF with CF, endocrine and exocrine pancreatic insufficiency, DM admitted with failure to thrive, severe protein calorie malnutrition.?  She is followed by [facility].?  ?   She was recently admitted for similar issues last month.? While inpatient her intake improved and no interventions were performed.? In addition, she refused PEG tube placement at that time.?  ?   Detailed history obtained at that time regarding her nutrition over this past year.? Briefly, she was plagued with nausea and vomiting earlier this year which is when she experienced a majority of her weight loss.?She was hospitalized at [facility]?spanning from April to May.? She had?extensive testing performed including blood work and upper and lower endoscopy.? Pertinent labs I have listed below.? She was eventually discharged?as she had garnered some improvement during this hospitalization.? Her nausea and vomiting resolved but she was unable to gain any weight back and there was concern for continued weight loss.? She reported a fair appetite.? She weighed around 67 lbs at that time.?  ?   She did undergo some additional testing during last hospitalization.? Most notably fecal calprotectin was elevated.? Stool for alpha 1 antitrypsin appears to have been sent but I do not see those results.? Vitamin D was low.? I recommended small bowel capsule but this was never done as an outpatient.  ?   Since discharge she reports largely no big changes. She continues to have some early satiety, says sometimes she can only eat 1/2 a piece of pizza.? Her appetite is not great but she is hungry.? She is unsure if she has lost weight.? Weight measured here is 69 lbs and 77 lbs on  two different scales today.  ?   She states her bowel movements are twice a day on average.? She does state that she has bowel movements at night as well.? Stools or?formed for the most part.? She denies any oily?stools,?floating stools, or sticky stools.? She denies any rectal bleeding or melena.? She is taking?3 Creon 24,000?with meals and?1 Creon 24,00 with snacks which I decreased? based her weight last admission.  ?   Labs from April to May 2018:  OFELIA negative  dsDNA negative  RF factor negative  Remainder of Rheumatology serologies negative  ?   TSH normal  Normal Immunofixation  p- and c-ANCA negative  Bone marrow biopsy unremarkable  ?  24 hour urine protein 936 mg  Random protein 95  ?  Infectious stool studies negative  TTG IgA negative  Gliadin Ab negative  Fecal calprotectin elevated (168, 300) on separate occasions  ?  Imaging:  MRI abdomen?with contrast: appendiceal thickening but otherwise unremarkable  CT abdomen with contrast: unremarkable  Abd US: normal  Review of Systems  Constitutional:??no weight loss,?fatigue,?no fever,?no chills,?weakness,?  HEENT:???no pain,?no redness,?no blurry or double vision,??no hoarseness,?no thrush,?no non-healing sores.  Cardiovascular:?no chest pain or discomfort,?tightness,?no palpitations,?no SOB with activity,??no swelling,?  Respiratory:??cough,?sputum,?no coughing up blood,?SOB,?no wheezing,?no painful breathing.  Gastrointestinal:?no swallowing difficulty,?no heartburn,?no change in appetite,?no nausea,?no change in bowel habits,?no rectal bleeding,?no constipation,?no diarrhea,?no yellow eyes or skin.  Musculoskeletal:?no muscle or joint pain,?no stiffness,?no back pain,?no redness of joints,?no swelling of joints,?  Skin:?no rashes,?no lumps,?no itching,?no dryness,?color normal for ethnicity,?  Neurologic:?no dizziness,?no fainting,?no seizures,?no weakness,?no numbness,?no tingling,?  Psychiatric:?no nervousness,?no stress,?no depression,?no memory  loss.  Hematologic:?no ease of bruising,?no ease of bleeding.  Physical Exam  Vitals & Measurements  T:?36.6? ?C (Oral)? TMIN:?36.6? ?C (Oral)? TMAX:?37.3? ?C (Oral)? HR:?88(Peripheral)? RR:?16? BP:?102/60? SpO2:?95%?  General:?ill appearing, cachetic  Eye: PERRLA, EOMI, clear conjunctiva  HENT:? braces, oropharynx without erythema/exudate, oropharynx and nasal mucosal surfaces dry, temporal wasting, braces  Neck:? no thyromegaly or lymphadenopathy  Respiratory:?rhonchi bilaterally  Cardiovascular:?regular rate and rhythm without murmurs, gallops or rubs  Gastrointestinal:?soft, non-tender, non-distended with normal bowel sounds, without masses to palpation, no hepatosplenomegaly  Integumentary: no rashes or skin lesions present  Neurologic: cranial nerves intact, no asterixis, awake, alert, and oriented  Psych:?fair insight, appropriate mood  Assessment/Plan  1.?Severe protein calorie malnutrition.??  ? In terms of gastrointestinal etiology,?many things have been ruled out namely Celiac disease, large bowel IBD.? I still have not had a chance to review her endoscopy reports.?? Crohns disease of the small intestine remains a possibility; however this would best be evaluated with a capsule endoscopy.? Celiac disease has been ruled out with TTG IgA?negative serologies.?  ?   she does not wish to pursue?a PEG tube placement at this time again.? I am not willing to place a PEG tube in someone who does not want it.? If PEG tube is pulled out purposefully or accidentally within 2-4 weeks of placement, this could be a disaster for her.? We again discussed the importance of her nutrition and the only way to avoid tube placement for supplemental nutrition is for her to eat.? I would pursue calorie counts while in the hospital.? The other concern would be the amount of supplement tube feeding formula she may need in order to obtain sufficient caloric intake may overwhelm her and she may not be able to tolerate tube feedings  well.? Now, some supplemental nutrition is better than none but if the goal?is?for her to obtain a goal caloric intake daily, this may be still difficult for her to day even with supplemental tube feedings especially if she has difficulty tolerating them.? Dosing her pancreatic enzyme replacement may get tricky as she would need pancreatic enzymes to help absorb her tube feedings;?therefore she would not be able to be on continuous tube feedings.? Bolus tube feedings would be needed and due to the volume that is administered at one time with a bolus, I suspect?she may not be able to tolerated this well.???Again can consider NG tube placement and?supplemental tube feedings to challenge her as well as provide supplemental nutrition.  ?   Recommend:  Repeat fecal calprotectin  Small bowel capsule endoscopy when this can be arranged.? If needed can repeat upper endoscopy to evaluate extent of small bowel that can be seen.  ?   2.?Cystic fibrosis with gastrointestinal manifestations  ?Continue Creon 24,000 to 3 pills with meals and 1 with?snacks.?  A 72 hour fecal fat?stool collection would be the only way to?evaluate for?adequate?absorption with?pancreatic enzyme replacement?in the setting of exocrine pancreatic insufficiency.? This can be pursued if there is concern that most of her malnutrition is due to malabsorption.?   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  CF (cystic fibrosis)  Chronic disease-related malnutrition  CYSTIC FIBROSIS WITH GASTROINTESTINAL MANIFESTATIONS  Cystic fibrosis with pulmonary exacerbation  Diabetes mellitus related to cystic fibrosis  Diabetes mellitus related to cystic fibrosis  Encounter for adjustment or management of vascular access device  Hypoalbuminemia  Polyarticular arthritis  Primary amenorrhea  Pseudomonas infection  Severe malnutrition  Vomiting  Weight loss  Historical  No  qualifying data  Procedure/Surgical History  MEDIPORT REMOVAL AND RE-PLACEMENT (05/15/2018)  Mediport placement  Mediport removal   Medications  Inpatient  acetaminophen 325 mg oral tablet, 650 mg= 2 tab(s), Oral, q6hr, PRN  albuterol 0.083% inhalation solution, 2.5 mg= 3 mL, NEB, q6hr, PRN  azithromycin 250 mg oral tablet, 500 mg= 2 tab(s), Oral, BID MWF  Creon 24,000 units oral delayed release capsule, See Instructions, Oral, TID  cyproheptadine 4 mg oral tablet, 2 mg= 0.5 tab(s), Oral, BID  Dextrose 50% and Water (50 mL vial/syringe), 12.5 gm= 25 mL, IV Push, As Directed, PRN  Dextrose 50% and Water (50 mL vial/syringe), 12.5 gm= 25 mL, IV Push, Once, PRN  Dextrose 50% and Water (50 mL vial/syringe), 25 gm= 50 mL, IV Push, As Directed, PRN  Dextrose 50% in Water intravenous solution, 12.5 gm= 25 mL, IV Push, As Directed, PRN  glucagon recombinant 1 mg injection, 1 mg= 1 EA, IM, q10min, PRN  glucagon recombinant 1 mg injection, 1 mg= 1 EA, IM, q10min, PRN  glucose 40% oral gel, 15 gm= 0.5 tube(s), Oral, As Directed, PRN  Hyper-Sal 7% inhalation solution, 1 neb, INH, BID  ibuprofen 400 mg oral tablet, 400 mg= 1 tab(s), Oral, q6hr, PRN  insulin glargine 100 U/mL (per pen), 20 units= 0.2 mL, Subcutaneous, BID  insulin lispro 100 units/mL subcutaneous injection, 2-14 units, Subcutaneous, As Directed, PRN  IVF Normal Saline NS Infusion 1,000 mL, 1000 mL, IV  Lovenox 40mg Inj, 40 mg= 0.4 mL, Subcutaneous, BID  Megace, 20 mg= 0.5 tab(s), Oral, Daily  montelukast 5 mg oral tablet, CHEWABLE, 10 mg= 2 tab(s), Oral, Daily  Nasal Saline 0.65% solution, 2 drop(s), Nasal, q2hr  Normal Saline (0.9% NS)  mL, 500 mL, IV  Plaquenil 200 mg oral tablet, 200 mg= 1 tab(s), Oral, BID  prednisONE 10 mg oral tablet, 10 mg= 1 tab(s), Oral, Daily  Protonix 20 mg ORAL enteric coated tablet, 40 mg= 1 tab(s), Oral, Daily  Pulmozyme 2.5 mg/2.5 mL inhalation solution, 2.5mg, 2.5ml, NEB, Daily  Tobramycin Inhalation solution 300 mg/4ml, 300  mg= 4 mL, NEB, q12hr  vancomycin, 750 mg= 150 mL, IV Piggyback, q12hr  Zofran 2 mg/mL injectable solution, 4 mg= 2 mL, IV Push, q4hr, PRN  Zofran 2 mg/mL injectable solution, 8 mg= 4 mL, IV Push, q4hr, PRN  Zosyn 3 g-0.375 g/50 mL intravenous solution, 3.375 gm= 50 mL, IV Piggyback, q8hr  Home  Accu-Chek AvivaPlus test strips, See Instructions, 5 refills  AquADEKs oral tablet, chewable, 1 tab(s), Oral, Daily, 6 refills  azithromycin 500 mg oral tablet, See Instructions, 1 refills  Bethkis 75 mg/mL inhalation solution, 300 mg= 4 mL, NEB, BID, 5 refills  Creon 24,000 units oral delayed release capsule, See Instructions, 5 refills  cyproheptadine 4 mg oral tablet, 2 mg= 0.5 tab(s), Oral, BID, 5 refills  HUMALOG 100 UNIT/ML SOLN  Hyper-Sal 7% inhalation solution, 1 neb, INH, BID, 6 refills  Mediport flush, See Instructions, 11 refills  montelukast 10 mg oral TABLET, 10 mg= 1 tab(s), Oral, Daily, 5 refills  naproxen 375 mg oral delayed release tablet, 375 mg= 1 tab(s), Oral, BID, 3 refills  Nasal Saline 0.65% solution, 2 drop(s), Nasal, q2hr, 11 refills  One Touch Delica 31 guage lancets, See Instructions, 5 refills  Plaquenil 200 mg oral tablet, 200 mg= 1 tab(s), Oral, BID, 5 refills  prednisONE 10 mg oral tablet, 20 mg= 2 tab(s), Oral, Daily, 1 refills  ProAir HFA 90 mcg/inh inhalation aerosol with adapter, 2 puff(s), INH, BID, 6 refills  Pulmozyme 2.5 mg/2.5 mL inhalation solution, 2.5 mg= 2.5 mL, NEB, Daily, 6 refills  sure comfort 31G Pen Needles, See Instructions, 5 refills  Toujeo SoloStar 300 units/mL subcutaneous solution, 16 units, Subcutaneous, Daily, 5 refills  Allergies  No Known Allergies  Social History  Alcohol - Low Risk, 06/17/2013  Never, 03/31/2016  Employment/School - Medium Risk, 06/17/2013  Student, 10/11/2018  Student, Work/School description: 12th grade. Homeschooled., 09/17/2018  Student, 06/05/2018  Student, Work/School description: 11th grade., 10/19/2017  Student, Work/School description: 10  th grade at [employer]., 11/16/2016  Student, 09/23/2015  Exercise - Regular exercise, 06/03/2015  Exercise frequency: Daily., 03/31/2016  Home/Environment - Low Risk, 06/17/2013  Lives with Mother, Siblings. Alcohol abuse in household: No. Substance abuse in household: No. Smoker in household: No. Injuries/Abuse/Neglect in household: No. Feels unsafe at home: No. Safe place to go: Yes. Agency(s)/Others notified: No. Family/Friends available for support: Yes. Concern for family members at home: Yes. Major illness in household: Yes. Financial concerns: Yes. TV/Computer concerns: Yes., 03/31/2016  Lives with Mother, Siblings., 09/23/2015  Nutrition/Health - Medium Risk, 06/17/2013  High protein High calorie, 10/11/2018  Sexual  Sexually active: No., 03/31/2016  Substance Abuse - Low Risk, 06/17/2013  Never, 03/31/2016  Tobacco - Low Risk, 06/17/2013  Never smoker, N/A, Household tobacco concerns: No., 10/29/2018  Never smoker, 03/31/2016  Family History  Asthma: Mother.  Eczema: Mother.  Immunizations  Vaccine Date Status Comments   influenza virus vaccine, inactivated 10/11/2018 Given Med Not Available  VFC unavailable at this time. ?Patient status required administration now per Dr. Campo   influenza virus vaccine, inactivated 10/19/2017 Given    influenza virus vaccine, inactivated 11/16/2016 Given    meningococcal conjugate vaccine 01/04/2016 Given    hepatitis A pediatric vaccine 01/04/2016 Given    hepatitis A pediatric vaccine 01/14/2015 Recorded    influenza virus vaccine, inactivated 11/06/2014 Recorded    influenza virus vaccine, inactivated 09/16/2013 Recorded    influenza virus vaccine, inactivated 10/12/2012 Recorded    influenza virus vaccine, inactivated 09/15/2011 Recorded    meningococcal conjugate vaccine 12/08/2010 Recorded    influenza virus vaccine, live, trivalent 11/17/2009 Recorded    varicella virus vaccine 03/24/2009 Recorded    influenza virus vaccine, live, trivalent 10/29/2008 Recorded     influenza virus vaccine, inactivated 10/23/2007 Recorded    influenza virus vaccine, inactivated 11/09/2006 Recorded    influenza virus vaccine, inactivated 12/12/2005 Recorded    influenza virus vaccine, inactivated 11/01/2004 Recorded    poliovirus vaccine, inactivated 11/25/2003 Recorded    measles/mumps/rubella virus vaccine 11/25/2003 Recorded    diphtheria/pertussis, acel/tetanus ped 11/25/2003 Recorded    pneumococcal 7-valent vaccine 11/25/2003 Recorded    diphtheria/pertussis, acel/tetanus ped 02/12/2001 Recorded    pneumococcal 7-valent vaccine 02/12/2001 Recorded    varicella virus vaccine 02/12/2001 Recorded    poliovirus vaccine, inactivated 11/09/2000 Recorded    measles/mumps/rubella virus vaccine 11/09/2000 Recorded    diphtheria/pertussis, acel/tetanus ped 11/09/2000 Recorded    haemophilus b conjugate (PRP-T) vaccine 11/09/2000 Recorded    hepatitis B pediatric vaccine 08/23/2000 Recorded    hepatitis B pediatric vaccine 03/06/2000 Recorded    hepatitis B pediatric vaccine 01/03/2000 Recorded    Lab Results  Test Name Test Result Date/Time   Sodium Lvl 132 mmol/L (Low) 10/29/2018 15:00 CDT   Potassium Lvl 3.9 mmol/L 10/29/2018 15:00 CDT   Chloride 95 mmol/L (Low) 10/29/2018 15:00 CDT   CO2 32 mmol/L 10/29/2018 15:00 CDT   Calcium Lvl 8.4 mg/dL (Low) 10/29/2018 15:00 CDT   Glucose Lvl 362 mg/dL (High) 10/29/2018 15:00 CDT    mg/dL (High) 10/29/2018 15:00 CDT   BUN 10 mg/dL 10/29/2018 15:00 CDT   Creatinine 0.60 mg/dL 10/29/2018 15:00 CDT   Bili Total 0.4 mg/dL 10/29/2018 15:00 CDT   Bili Direct 0.2 mg/dL 10/29/2018 15:00 CDT   Bili Indirect 0.2 mg/dL 10/29/2018 15:00 CDT   AST 12 unit/L (Low) 10/29/2018 15:00 CDT   ALT 7 unit/L (Low) 10/29/2018 15:00 CDT   Alk Phos 97 unit/L 10/29/2018 15:00 CDT   Total Protein 8.4 gm/dL (High) 10/29/2018 15:00 CDT   Albumin Lvl 1.6 gm/dL (Low) 10/29/2018 15:00 CDT   NT pro BNP. 133 pg/mL (High) 10/29/2018 15:00 CDT   Hgb A1c 9.0 % (High) 10/29/2018 15:00  CDT   Prealbumin 8.2 mg/dL (Low) 10/29/2018 15:00 CDT   WBC 20.3 x10(3)/mcL (High) 10/29/2018 15:00 CDT   Hgb 9.2 gm/dL (Low) 10/29/2018 15:00 CDT   Hct 29.2 % (Low) 10/29/2018 15:00 CDT   Platelet 296 x10(3)/mcL 10/29/2018 15:00 CDT   MCV 88.0 fL 10/29/2018 15:00 CDT

## 2022-05-04 NOTE — HISTORICAL [facility] CERNER
This is a historical note converted from [employer]. Formatting and pictures may have been removed.  Please reference Na for original formatting and attached multimedia. Subjective  Ms. Luna is a 17 y/o female with seronegative inflammatory arthritis in the setting of significant?weight loss / malnutrition.? 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 Unfortunately, a unifying underlying diagnosis?was not identified, but she was?continued on prednisone?20mg/d. Later?Plaquenil and naproxen were added and prednisone was increased to 40mg/d.During this admission,?she has been tx?for PNA, undergone PEG placement and had CTA Abd to eval for vasculitis (negative). It wasnt clear that prednisone at 40mg/d was better than previous dosing of 20mg/d and she was successfully tapered to 10mg/d.  ?   Today she reports feeling slightly more stiffness in knees but otherwise ok.?Tolerating tube feeds ok.?GI symptoms stable. No?fever or chills. Cough is baseline. Has?injectable MTX (Rasuvo) and planned to start this weekend after completes abx for PNA. I decreased plaquenil to qd (weight based dosing) last week.  Review of Systems  Negative except as stated in HPI  Objective  Vitals & Measurements  T:?36.7? ?C (Oral)? TMIN:?36.4? ?C (Oral)? TMAX:?37.2? ?C (Oral)? HR:?112(Peripheral)? HR:?141(Monitored)? RR:?18? BP:?107/66? SpO2:?99%?  WT:?36.6?kg?  Physical Exam  General examination: cachetic  Head: Normocephalic, atraumatic.  Eyes: Conjunctiva clear, upper eyelids normal, lower eyelids normal.  Oral cavity: Mucosa moist, no lesions, fair dentition.  Throat: No exudate, no erythema.  Neck/thyroid: Neck supple, no lymphadenopathy, no thyroid nodules  Skin: No suspicious lesions, warm and dry.  Heart:?S1, S2 normal, regular rate and rhythm. ?No JVD.  Lungs: Clear to auscultation, no accessory muscle use.  Musculoskeletal:?No synovitis,?no deformities.  Extremities: No clubbing or cyanosis.  Neurologic: Alert and oriented, cranial nerves II through XII grossly intact.? Muscle strength grossly intact. ?Sensation grossly intact.  Psych:?Cognitive function intact, mood/affect full range.  Assessment/Plan  Ms. Luna is a 19 y/o female? 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 Current admission included tx for PNA, peg tube placement and?taper of prednisone to 10mg/d from 40mg/d.  ?   1. Seronegative polyarticular inflammatory arthritis (knees, wrists and elbows). Re-ordered ANCAs as not collected. OFELIA, RF, CCP negative. No signs of vasculitis on imaging. Radiographs not suggestive periositis or erosions.?Porphyrins negative, ACE WNL and no signs of LAD. 24 hour urine ~700mg but pt does have hx of DM. Consider Whipples given GI symptoms. Last week I decreased plaquenil to 200mg/d (weight based dosing) and she continues to do fairly well from a symptomatic standpoint on 10mg/d prednisone.?Synovitis is stable but?ESR >100 and CRP 19.?She has injectable MTX (Rasuvo) and I discussed starting it this weekend after completion of abx for?pneumonia. ?Also discussed a daily folic acid?supplementation and risks of MTX  including teratogenicity, bone marrow suppression, liver irritation/failure. Pt and mother both present. Will likely taper prednisone in f/u in a few weeks. Currently arranged for f/u in 1m - will move up appt?for re-eval/steroid taper.  ?   2. GNR and MRSA Pneumonia. Completed IV abx and d/c on clinda x 5d.  ?   3. Cystic fibrosis w/ DM and pancreatic insufficiency. Medications optimized during admission and s/p PEG.  ?   4. Anemia s/p 2 UPRBCs. Likely d/t underlying inflammatory process. Hopefully will improve w/ MTX. Will monitor moving forward.  ?   5. FTT. s/p PEG.  ?  ?  ?

## 2022-05-16 ENCOUNTER — OFFICE VISIT (OUTPATIENT)
Dept: INTERNAL MEDICINE | Facility: CLINIC | Age: 22
End: 2022-05-16

## 2022-05-16 VITALS
SYSTOLIC BLOOD PRESSURE: 133 MMHG | RESPIRATION RATE: 18 BRPM | HEART RATE: 111 BPM | WEIGHT: 71 LBS | TEMPERATURE: 99 F | BODY MASS INDEX: 13.94 KG/M2 | DIASTOLIC BLOOD PRESSURE: 88 MMHG | HEIGHT: 60 IN

## 2022-05-16 DIAGNOSIS — E11.69 TYPE 2 DIABETES MELLITUS WITH OTHER SPECIFIED COMPLICATION, WITH LONG-TERM CURRENT USE OF INSULIN: Chronic | ICD-10-CM

## 2022-05-16 DIAGNOSIS — Z79.4 TYPE 2 DIABETES MELLITUS WITH OTHER SPECIFIED COMPLICATION, WITH LONG-TERM CURRENT USE OF INSULIN: Chronic | ICD-10-CM

## 2022-05-16 DIAGNOSIS — E84.9 CYSTIC FIBROSIS: Chronic | ICD-10-CM

## 2022-05-16 DIAGNOSIS — M24.561 BILATERAL KNEE CONTRACTURES: Chronic | ICD-10-CM

## 2022-05-16 DIAGNOSIS — K86.89 PANCREATIC INSUFFICIENCY: Chronic | ICD-10-CM

## 2022-05-16 DIAGNOSIS — M24.562 BILATERAL KNEE CONTRACTURES: Chronic | ICD-10-CM

## 2022-05-16 DIAGNOSIS — E55.9 VITAMIN D DEFICIENCY: Chronic | ICD-10-CM

## 2022-05-16 PROBLEM — E11.9 DM (DIABETES MELLITUS): Chronic | Status: ACTIVE | Noted: 2022-05-16

## 2022-05-16 RX ORDER — PANCRELIPASE 24000; 76000; 120000 [USP'U]/1; [USP'U]/1; [USP'U]/1
1 CAPSULE, DELAYED RELEASE PELLETS ORAL
Qty: 120 CAPSULE | Refills: 6 | Status: SHIPPED | OUTPATIENT
Start: 2022-05-16 | End: 2022-11-30 | Stop reason: SDUPTHER

## 2022-05-16 RX ORDER — INSULIN GLARGINE 300 U/ML
INJECTION, SOLUTION SUBCUTANEOUS
COMMUNITY
Start: 2022-05-03 | End: 2022-05-16 | Stop reason: SDUPTHER

## 2022-05-16 RX ORDER — PANCRELIPASE 24000; 76000; 120000 [USP'U]/1; [USP'U]/1; [USP'U]/1
CAPSULE, DELAYED RELEASE PELLETS ORAL
COMMUNITY
Start: 2022-05-03 | End: 2022-05-16 | Stop reason: SDUPTHER

## 2022-05-16 RX ORDER — ALBUTEROL SULFATE 90 UG/1
AEROSOL, METERED RESPIRATORY (INHALATION)
COMMUNITY
Start: 2022-05-03 | End: 2022-11-29

## 2022-05-16 RX ORDER — HYDROXYCHLOROQUINE SULFATE 200 MG/1
TABLET, FILM COATED ORAL
COMMUNITY
Start: 2022-04-13 | End: 2023-02-09 | Stop reason: SDUPTHER

## 2022-05-16 RX ORDER — INSULIN GLARGINE 300 U/ML
57 INJECTION, SOLUTION SUBCUTANEOUS DAILY
Qty: 1 PEN | Refills: 6 | Status: SHIPPED | OUTPATIENT
Start: 2022-05-16 | End: 2022-11-30 | Stop reason: SDUPTHER

## 2022-05-16 RX ORDER — MONTELUKAST SODIUM 10 MG/1
TABLET ORAL
COMMUNITY
Start: 2022-04-13 | End: 2022-11-30 | Stop reason: SDUPTHER

## 2022-05-16 RX ORDER — VIT C/E/ZN/COPPR/LUTEIN/ZEAXAN 250MG-90MG
1000 CAPSULE ORAL DAILY
COMMUNITY
End: 2022-11-30 | Stop reason: SDUPTHER

## 2022-05-16 RX ORDER — FOLIC ACID 1 MG/1
TABLET ORAL
COMMUNITY
Start: 2022-04-13 | End: 2022-05-16 | Stop reason: SDUPTHER

## 2022-05-16 RX ORDER — AZITHROMYCIN 500 MG/1
TABLET, FILM COATED ORAL
COMMUNITY
Start: 2022-01-18 | End: 2022-11-30 | Stop reason: SDUPTHER

## 2022-05-16 RX ORDER — ERGOCALCIFEROL 1.25 MG/1
CAPSULE ORAL
COMMUNITY
Start: 2022-04-13 | End: 2023-12-27

## 2022-05-16 RX ORDER — SODIUM CHLORIDE FOR INHALATION 7 %
VIAL, NEBULIZER (ML) INHALATION
COMMUNITY
Start: 2022-05-03 | End: 2022-05-16 | Stop reason: SDUPTHER

## 2022-05-16 RX ORDER — PEN NEEDLE, DIABETIC 31 GX5/16"
NEEDLE, DISPOSABLE MISCELLANEOUS
COMMUNITY
Start: 2022-03-14 | End: 2022-05-16 | Stop reason: SDUPTHER

## 2022-05-16 RX ORDER — TOBRAMYCIN 300 MG/4ML
SOLUTION RESPIRATORY (INHALATION)
COMMUNITY
Start: 2022-05-16

## 2022-05-16 RX ORDER — CYPROHEPTADINE HYDROCHLORIDE 4 MG/1
4 TABLET ORAL DAILY
Qty: 30 TABLET | Refills: 6 | Status: SHIPPED | OUTPATIENT
Start: 2022-05-16 | End: 2022-11-30 | Stop reason: SDUPTHER

## 2022-05-16 RX ORDER — CYPROHEPTADINE HYDROCHLORIDE 4 MG/1
4 TABLET ORAL
COMMUNITY
Start: 2021-10-05 | End: 2022-05-16 | Stop reason: SDUPTHER

## 2022-05-16 RX ORDER — FERROUS SULFATE 220 (44)/5
5 ELIXIR ORAL DAILY
Qty: 473 ML | Refills: 6 | Status: SHIPPED | OUTPATIENT
Start: 2022-05-16 | End: 2022-11-30 | Stop reason: SDUPTHER

## 2022-05-16 RX ORDER — METHOTREXATE 25 MG/.4ML
INJECTION, SOLUTION SUBCUTANEOUS
COMMUNITY
Start: 2022-04-21 | End: 2023-12-27

## 2022-05-16 RX ORDER — NAPROXEN 375 MG/1
TABLET ORAL
COMMUNITY
Start: 2022-04-13 | End: 2022-09-06 | Stop reason: SDUPTHER

## 2022-05-16 RX ORDER — INSULIN LISPRO 100 [IU]/ML
14 INJECTION, SOLUTION INTRAVENOUS; SUBCUTANEOUS
Qty: 3 EACH | Refills: 6 | Status: SHIPPED | OUTPATIENT
Start: 2022-05-16 | End: 2022-09-06 | Stop reason: SDUPTHER

## 2022-05-16 RX ORDER — ELEXACAFTOR, TEZACAFTOR, AND IVACAFTOR 100-50-75
KIT ORAL
COMMUNITY
Start: 2022-04-21

## 2022-05-16 RX ORDER — INSULIN LISPRO 100 [IU]/ML
INJECTION, SOLUTION INTRAVENOUS; SUBCUTANEOUS
COMMUNITY
Start: 2022-05-10 | End: 2022-05-16 | Stop reason: SDUPTHER

## 2022-05-16 RX ORDER — FERROUS SULFATE 220 (44)/5
ELIXIR ORAL
COMMUNITY
Start: 2022-05-03 | End: 2022-05-16 | Stop reason: SDUPTHER

## 2022-05-16 RX ORDER — PEN NEEDLE, DIABETIC 31 GX5/16"
NEEDLE, DISPOSABLE MISCELLANEOUS
Qty: 100 EACH | Refills: 6 | Status: SHIPPED | OUTPATIENT
Start: 2022-05-16 | End: 2022-11-30 | Stop reason: SDUPTHER

## 2022-05-16 RX ORDER — FOLIC ACID 1 MG/1
1 TABLET ORAL DAILY
Qty: 30 TABLET | Refills: 6 | Status: SHIPPED | OUTPATIENT
Start: 2022-05-16 | End: 2022-11-30 | Stop reason: SDUPTHER

## 2022-05-16 RX ORDER — SODIUM CHLORIDE FOR INHALATION 7 %
VIAL, NEBULIZER (ML) INHALATION
Qty: 4 ML | Refills: 6 | Status: SHIPPED | OUTPATIENT
Start: 2022-05-16

## 2022-05-16 NOTE — PROGRESS NOTES
[facility] INTERNAL MEDICINE  OUTPATIENT OFFICE VISIT NOTE    SUBJECTIVE:      HPI: Ms. 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  This is patient's 1st visit in IM Clinic, previously followed in Pediatrics Clinic.  Also follows with rheumatologist, Dr. Tony, at [facility] and [facility] cystic fibrosis pulmonology Clinic in [place].  Has had a significant rheumatology workup, previously on Humira, Enbrel, chronic prednisone therapy, methotrexate.  Previous rheumatologist had referred her to Orthopedics Clinic in [place] with concerns for end-stage osteoarthritis of bilateral knees, was seen in [place], determined not to be good surgical candidate with recommendations for Botox therapy to relax bilateral contractures.  Follows with her pulmonolgist in [place] approx every 3 months.      Patient presented to clinic with her mother, had a lengthy discussion as it relates to her day-to-day life, patient is essentially wheel chair bound.  Has J-tube in place, though primary nutrition from oral feeds.  J-tube used for supplemental nutrition with things like milk, providing extra calories.  Enjoys watching TV, visiting with her family.  Family provides nearly 24 hour care as needed.  Great family support system, has one younger sister who is graduating from high school this week.      Last labs in previous EMR from 3/10/22:  CMP: Na 139. K 4.6, Cl 101, CO2 27, Ca 9.6  AST/ALT 33/62  A1c 5.4  Vit D 22.1  CBC: WBC 5.3, H&H 10.9/35.1, Platelets 345  TSH 1.75      ROS:  (-) Chest pain, palpitations, SOB, fever, chills, diarrhea, constipation.       OBJECTIVE:     Vital signs:   /88 (BP Location: Left arm, Patient Position:  Sitting, BP Method: Small (Automatic))   Pulse (!) 111   Temp 98.8 °F (37.1 °C)   Resp 18   Ht 5' (1.524 m)   Wt 32.2 kg (71 lb) Comment: per mother and patient  BMI 13.87 kg/m²      Physical Examination:  General: thin appearing young adult female in no acute distress,   Pulm: CTA bilaterally, normal work of breathing  CV: S1, S2 w/o murmurs or gallops; no edema noted  GI: Soft, non-tender to palpation  MSK: bilateral knee contractures, with thin musculature of bilateral LEs, no tenderness to palpation  Derm: No rashes, abnormal bruising, or skin lesions  Neuro: AAOx4;motor/sensory function intact, no gross motor deficits noted on exam   Psych: Cooperative; appropriate mood and affect, answers all questions appropriately       ASSESSMENT & PLAN:     Cystic fibrosis  -follows with [facility] cystic fibrosis Clinic, will request their records today, sees them every 3 months  -continue azithromycin daily  -continue Trikafta (Elexacaftor/tezacaftor/ivacaftor) for CF  -continue Pulmozyme neb  -continue Bethkis (Tobramycin)    Pancreatic insufficiency in setting of CF  Diabetes Mellitus related to above  -last A1c 5.4 on 03/22, well controlled  -continue Creon as it relates to pancreatic insuffiency  -continue Toujeo, takes approximately 57-58 units in BID dosing  -continue lispro 14 U TIDWM    RF+ Rheumatoid Arthritis  -follows with Dr. Tony at [facility], previously followed with Dr. Canas at [facility]  -continue methotrexate  -continue hydroxychloroquine     Osteoporosis  -continue Prolia, prescribed by Rheum    Bilateral knee contractures  -evaluated by Orthopedic surgery [place], deemed not to be good surgical candidate and regards to bilateral knee replacements  -recommendations for Botox treatment in regards to bilateral knee muscle contractures    Health Maintenance:  -reports being up to date on all vaccinations except COVID-19 series  -discussion with mother in regards to benefit of COVID-19 vaccine, informed  that she does not need prescription or order, can present to any pharmacy for vaccine      Obtain [facility] records  Repeat routine labs at next visit  Return to clinic in 3 months.     Delmer Schmidt, DO  [facility] Internal Medicine PGY2

## 2022-05-18 NOTE — PROGRESS NOTES
Attending Addendum:   Patient seen and examined in clinic. Management and Plan were discussed with resident. Care was reasonable and necessary.   Mariposa Stauffer MD  [facility]

## 2022-06-08 ENCOUNTER — TELEPHONE (OUTPATIENT)
Dept: INTERNAL MEDICINE | Facility: CLINIC | Age: 22
End: 2022-06-08

## 2022-06-08 NOTE — TELEPHONE ENCOUNTER
Patient needs a script written for a new wheelchair and a portable potty. Patient would lie this sent to [facility]. On the script please add Patients weight, height,and dx codes.    Please be sure to include in your notes the following:    *Why the patient needs a wheelchair.  *If the patient has a Rolator, if the patient has a Rolator why isn't it efficient enough.  *Can the Patient propel herself ? If not do they have someone to propel them.      [facility]  Phone number- [phone]  Fax number [phone]

## 2022-07-05 ENCOUNTER — TELEPHONE (OUTPATIENT)
Dept: RHEUMATOLOGY | Facility: CLINIC | Age: 22
End: 2022-07-05
Payer: MEDICAID

## 2022-07-05 NOTE — TELEPHONE ENCOUNTER
----- Message from Altagracia Marie sent at 7/5/2022 12:45 PM CDT -----  Regarding: prior auth  Yefri burgess/[employer] [phone] called in regards to rx KINERET and a prior auth is needed. They faxed over a prior auth form that was to be signed and sent back      Thank You  Jeanette ARANDA

## 2022-07-05 NOTE — TELEPHONE ENCOUNTER
Called [employer] by [facility] pharmacy to f/u on Kineret  ---also dis so on 6/28/2022  Explained pt seeing a new Rheumatologist Dr. Tony  Office and fax numbers provided

## 2022-08-08 ENCOUNTER — CLINICAL SUPPORT (OUTPATIENT)
Dept: PEDIATRICS | Facility: CLINIC | Age: 22
End: 2022-08-08
Payer: MEDICAID

## 2022-08-08 DIAGNOSIS — Z95.828 PORT-A-CATH IN PLACE: Primary | ICD-10-CM

## 2022-08-08 PROCEDURE — 96372 THER/PROPH/DIAG INJ SC/IM: CPT | Mod: PBBFAC,PN

## 2022-08-08 RX ORDER — HEPARIN 100 UNIT/ML
5 SYRINGE INTRAVENOUS
Status: COMPLETED | OUTPATIENT
Start: 2022-08-08 | End: 2022-08-08

## 2022-08-08 RX ADMIN — HEPARIN 500 UNITS: 100 SYRINGE at 01:08

## 2022-08-08 NOTE — PROGRESS NOTES
Here for mediport flush.  Using sterile technique accessed port with 20g hubner needle, flushed with 10ml normal saline with ease, no blood return noted.  Flushed with 5ml heparinized saline 100units/ml with ease.  Tolerated procedure well.

## 2022-11-02 ENCOUNTER — CLINICAL SUPPORT (OUTPATIENT)
Dept: PEDIATRICS | Facility: CLINIC | Age: 22
End: 2022-11-02
Payer: MEDICAID

## 2022-11-02 DIAGNOSIS — E55.9 VITAMIN D DEFICIENCY: Chronic | ICD-10-CM

## 2022-11-02 DIAGNOSIS — M24.562 BILATERAL KNEE CONTRACTURES: Chronic | ICD-10-CM

## 2022-11-02 DIAGNOSIS — E84.9 CYSTIC FIBROSIS: Primary | Chronic | ICD-10-CM

## 2022-11-02 DIAGNOSIS — K86.89 PANCREATIC INSUFFICIENCY: Chronic | ICD-10-CM

## 2022-11-02 DIAGNOSIS — Z79.4 DIABETES MELLITUS DUE TO UNDERLYING CONDITION WITHOUT COMPLICATION, WITH LONG-TERM CURRENT USE OF INSULIN: ICD-10-CM

## 2022-11-02 DIAGNOSIS — E08.9 DIABETES MELLITUS DUE TO UNDERLYING CONDITION WITHOUT COMPLICATION, WITH LONG-TERM CURRENT USE OF INSULIN: ICD-10-CM

## 2022-11-02 DIAGNOSIS — M24.561 BILATERAL KNEE CONTRACTURES: Chronic | ICD-10-CM

## 2022-11-02 PROBLEM — Q99.9: Status: ACTIVE | Noted: 2022-11-02

## 2022-11-02 PROBLEM — M06.9 RHEUMATOID ARTHRITIS: Status: ACTIVE | Noted: 2022-11-02

## 2022-11-02 PROBLEM — E88.09 HYPOALBUMINEMIA: Status: ACTIVE | Noted: 2022-11-02

## 2022-11-02 PROBLEM — R80.9 PROTEINURIA: Status: ACTIVE | Noted: 2022-11-02

## 2022-11-02 PROBLEM — N91.0 PRIMARY PHYSIOLOGIC AMENORRHEA: Status: ACTIVE | Noted: 2022-11-02

## 2022-11-02 PROBLEM — E63.9 DEFICIENCY OF MACRONUTRIENTS: Status: ACTIVE | Noted: 2022-11-02

## 2022-11-02 PROBLEM — M81.0 OSTEOPOROSIS: Status: ACTIVE | Noted: 2022-11-02

## 2022-11-02 PROBLEM — Z74.09 IMPAIRED MOBILITY: Status: ACTIVE | Noted: 2022-11-02

## 2022-11-02 PROBLEM — M25.551 BILATERAL HIP PAIN: Status: ACTIVE | Noted: 2022-02-08

## 2022-11-02 PROBLEM — Z93.1 GASTROSTOMY STATUS: Status: ACTIVE | Noted: 2022-11-02

## 2022-11-02 PROBLEM — M25.552 BILATERAL HIP PAIN: Status: ACTIVE | Noted: 2022-02-08

## 2022-11-02 PROBLEM — E46 MALNUTRITION RELATED TO CHRONIC DISEASE: Status: ACTIVE | Noted: 2022-11-02

## 2022-11-02 PROCEDURE — 96372 THER/PROPH/DIAG INJ SC/IM: CPT | Mod: PBBFAC,PN

## 2022-11-02 PROCEDURE — 96523 IRRIG DRUG DELIVERY DEVICE: CPT | Mod: PBBFAC

## 2022-11-02 PROCEDURE — 99211 OFF/OP EST MAY X REQ PHY/QHP: CPT | Mod: PBBFAC,PN

## 2022-11-02 PROCEDURE — 90686 IIV4 VACC NO PRSV 0.5 ML IM: CPT | Mod: PBBFAC,PN

## 2022-11-02 RX ORDER — HEPARIN 100 UNIT/ML
5 SYRINGE INTRAVENOUS
Status: COMPLETED | OUTPATIENT
Start: 2022-11-02 | End: 2022-11-02

## 2022-11-02 RX ADMIN — HEPARIN 500 UNITS: 100 SYRINGE at 01:11

## 2022-11-30 ENCOUNTER — OFFICE VISIT (OUTPATIENT)
Dept: INTERNAL MEDICINE | Facility: CLINIC | Age: 22
End: 2022-11-30
Payer: MEDICAID

## 2022-11-30 VITALS
DIASTOLIC BLOOD PRESSURE: 72 MMHG | SYSTOLIC BLOOD PRESSURE: 103 MMHG | BODY MASS INDEX: 12.08 KG/M2 | TEMPERATURE: 98 F | HEIGHT: 61 IN | HEART RATE: 88 BPM | RESPIRATION RATE: 18 BRPM | WEIGHT: 64 LBS

## 2022-11-30 DIAGNOSIS — E55.9 VITAMIN D DEFICIENCY: Chronic | ICD-10-CM

## 2022-11-30 DIAGNOSIS — E84.0 CYSTIC FIBROSIS OF THE LUNG: ICD-10-CM

## 2022-11-30 DIAGNOSIS — Z78.9 IMPAIRED MOBILITY AND ACTIVITIES OF DAILY LIVING: ICD-10-CM

## 2022-11-30 DIAGNOSIS — R35.0 URINARY FREQUENCY: Primary | ICD-10-CM

## 2022-11-30 DIAGNOSIS — Z79.4 DIABETES MELLITUS DUE TO UNDERLYING CONDITION WITHOUT COMPLICATION, WITH LONG-TERM CURRENT USE OF INSULIN: ICD-10-CM

## 2022-11-30 DIAGNOSIS — Z74.09 IMPAIRED MOBILITY AND ACTIVITIES OF DAILY LIVING: ICD-10-CM

## 2022-11-30 DIAGNOSIS — K86.89 PANCREATIC INSUFFICIENCY: Chronic | ICD-10-CM

## 2022-11-30 DIAGNOSIS — E08.9 DIABETES MELLITUS DUE TO UNDERLYING CONDITION WITHOUT COMPLICATION, WITH LONG-TERM CURRENT USE OF INSULIN: ICD-10-CM

## 2022-11-30 DIAGNOSIS — Z74.1 REQUIRES ASSISTANCE WITH ACTIVITIES OF DAILY LIVING (ADL): ICD-10-CM

## 2022-11-30 LAB
APPEARANCE UR: ABNORMAL
BACTERIA #/AREA URNS AUTO: ABNORMAL /HPF
BILIRUB UR QL STRIP.AUTO: NEGATIVE MG/DL
COLOR UR AUTO: YELLOW
GLUCOSE UR QL STRIP.AUTO: NORMAL MG/DL
HYALINE CASTS #/AREA URNS LPF: ABNORMAL /LPF
KETONES UR QL STRIP.AUTO: NEGATIVE MG/DL
LEUKOCYTE ESTERASE UR QL STRIP.AUTO: 75 UNIT/L
MUCOUS THREADS URNS QL MICRO: ABNORMAL /LPF
NITRITE UR QL STRIP.AUTO: NEGATIVE
PH UR STRIP.AUTO: 6.5 [PH]
PROT UR QL STRIP.AUTO: ABNORMAL MG/DL
RBC #/AREA URNS AUTO: ABNORMAL /HPF
RBC UR QL AUTO: NEGATIVE UNIT/L
SP GR UR STRIP.AUTO: 1.03
SQUAMOUS #/AREA URNS LPF: ABNORMAL /HPF
UROBILINOGEN UR STRIP-ACNC: ABNORMAL MG/DL
WBC #/AREA URNS AUTO: ABNORMAL /HPF

## 2022-11-30 PROCEDURE — 99214 OFFICE O/P EST MOD 30 MIN: CPT | Mod: PBBFAC

## 2022-11-30 PROCEDURE — 87088 URINE BACTERIA CULTURE: CPT

## 2022-11-30 PROCEDURE — 81001 URINALYSIS AUTO W/SCOPE: CPT

## 2022-11-30 RX ORDER — ALBUTEROL SULFATE 90 UG/1
AEROSOL, METERED RESPIRATORY (INHALATION)
Qty: 18 G | Refills: 6 | Status: SHIPPED | OUTPATIENT
Start: 2022-11-30 | End: 2023-12-27 | Stop reason: SDUPTHER

## 2022-11-30 RX ORDER — INSULIN GLARGINE 300 U/ML
57 INJECTION, SOLUTION SUBCUTANEOUS DAILY
Qty: 1 PEN | Refills: 8 | Status: SHIPPED | OUTPATIENT
Start: 2022-11-30 | End: 2023-03-23 | Stop reason: SDUPTHER

## 2022-11-30 RX ORDER — PANCRELIPASE 24000; 76000; 120000 [USP'U]/1; [USP'U]/1; [USP'U]/1
1 CAPSULE, DELAYED RELEASE PELLETS ORAL
Qty: 120 CAPSULE | Refills: 6 | Status: SHIPPED | OUTPATIENT
Start: 2022-11-30 | End: 2023-03-23 | Stop reason: SDUPTHER

## 2022-11-30 RX ORDER — CYPROHEPTADINE HYDROCHLORIDE 4 MG/1
4 TABLET ORAL DAILY
Qty: 90 TABLET | Refills: 6 | Status: SHIPPED | OUTPATIENT
Start: 2022-11-30

## 2022-11-30 RX ORDER — NAPROXEN 375 MG/1
375 TABLET ORAL DAILY PRN
Qty: 30 TABLET | Refills: 2 | Status: SHIPPED | OUTPATIENT
Start: 2022-11-30 | End: 2023-07-10 | Stop reason: SDUPTHER

## 2022-11-30 RX ORDER — AZITHROMYCIN 500 MG/1
TABLET, FILM COATED ORAL
Qty: 30 TABLET | Refills: 6 | Status: SHIPPED | OUTPATIENT
Start: 2022-11-30 | End: 2023-03-23 | Stop reason: SDUPTHER

## 2022-11-30 RX ORDER — FERROUS SULFATE 220 (44)/5
5 ELIXIR ORAL
Qty: 473 ML | Refills: 6 | Status: SHIPPED | OUTPATIENT
Start: 2022-11-30 | End: 2023-12-27 | Stop reason: SDUPTHER

## 2022-11-30 RX ORDER — INSULIN LISPRO 100 [IU]/ML
14 INJECTION, SOLUTION INTRAVENOUS; SUBCUTANEOUS
Qty: 3 EACH | Refills: 8 | Status: SHIPPED | OUTPATIENT
Start: 2022-11-30 | End: 2023-03-23 | Stop reason: SDUPTHER

## 2022-11-30 RX ORDER — MONTELUKAST SODIUM 10 MG/1
10 TABLET ORAL DAILY
Qty: 90 TABLET | Refills: 6 | Status: SHIPPED | OUTPATIENT
Start: 2022-11-30 | End: 2023-12-27 | Stop reason: SDUPTHER

## 2022-11-30 RX ORDER — PEN NEEDLE, DIABETIC 31 GX5/16"
NEEDLE, DISPOSABLE MISCELLANEOUS
Qty: 100 EACH | Refills: 6 | Status: SHIPPED | OUTPATIENT
Start: 2022-11-30 | End: 2023-12-27 | Stop reason: SDUPTHER

## 2022-11-30 RX ORDER — VIT C/E/ZN/COPPR/LUTEIN/ZEAXAN 250MG-90MG
1000 CAPSULE ORAL DAILY
Qty: 90 CAPSULE | Refills: 6 | Status: SHIPPED | OUTPATIENT
Start: 2022-11-30 | End: 2023-12-27 | Stop reason: SDUPTHER

## 2022-11-30 RX ORDER — FOLIC ACID 1 MG/1
1 TABLET ORAL DAILY
Qty: 30 TABLET | Refills: 6 | Status: SHIPPED | OUTPATIENT
Start: 2022-11-30 | End: 2023-03-23

## 2022-11-30 NOTE — PROGRESS NOTES
"  [facility] INTERNAL MEDICINE  OUTPATIENT OFFICE VISIT NOTE    SUBJECTIVE:   Chief Complaint: Routine follow up     HPI: Ms. 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  Also follows with rheumatologist, Dr. Tony, at [facility] and [facility] cystic fibrosis pulmonology Clinic in [place].  Has had a significant rheumatology workup, previously on Humira, Enbrel, chronic prednisone therapy, methotrexate.  Previous rheumatologist had referred her to Orthopedics Clinic in [place] with concerns for end-stage osteoarthritis of bilateral knees, was seen in [place], determined not to be good surgical candidate with recommendations for Botox therapy to relax bilateral contractures.  Follows with her pulmonolgist in [place] approx every 3 months.       Patient is essentially wheel chair bound.  Has J-tube in place, though primary nutrition from oral feeds.  J-tube used for supplemental nutrition with things like milk, providing extra calories.  Enjoys watching TV, visiting with her family.  Family provides nearly 24 hour care as needed.  Great family support system.    Today, once again presents with her mother who provides round the clock care, no acute complaints today, reports that she was treated for a UTI several months ago with a course of ciprofloxacin.       ROS:  (+)  (-) Chest pain, palpitations, SOB, fever, night sweats, chills, diarrhea, constipation.       OBJECTIVE:     Vital signs:   /72 (BP Location: Left arm, Patient Position: Sitting, BP Method: Medium (Automatic))   Pulse 88   Temp 98.4 °F (36.9 °C) (Oral)   Resp 18   Ht 5' 1" (1.549 m)   Wt 29 kg (64 lb)   BMI 12.09 kg/m²      Physical " Examination:  General: thin appearing young adult female in no acute distress,   Pulm: CTA bilaterally, normal work of breathing  CV: S1, S2 w/o murmurs or gallops; no edema noted  GI: Soft, non-tender to palpation  MSK: bilateral knee contractures, with thin musculature of bilateral LEs, no tenderness to palpation  Derm: No rashes, abnormal bruising, or skin lesions  Neuro: AAOx4;motor/sensory function intact, no gross motor deficits noted on exam   Psych: Cooperative; appropriate mood and affect, answers all questions appropriately       ASSESSMENT & PLAN:   Cystic fibrosis  -follows with [facility] cystic fibrosis Clinic, will request their records today, sees them every 3 months  -continue azithromycin daily  -continue Trikafta (Elexacaftor/tezacaftor/ivacaftor) for CF  -continue Pulmozyme neb  -continue Bethkis (Tobramycin)     Pancreatic insufficiency in setting of CF  Diabetes Mellitus related to above  -last A1c 5.4 on 03/22, well controlled, ordered repeat A1c today  -continue Creon as it relates to pancreatic insuffiency  -continue Toujeo, takes approximately 57-58 units in BID dosing  -continue lispro 14 U TIDWM     RF+ Rheumatoid Arthritis  -follows with Dr. Tony at [facility], previously followed with Dr. Canas at [facility]  -continue methotrexate  -continue hydroxychloroquine      Osteoporosis  -continue Prolia, prescribed by Rheum     Bilateral knee contractures  -evaluated by Orthopedic surgery [place], deemed not to be good surgical candidate and regards to bilateral knee replacements  -awaiting Botox treatment in regards to bilateral knee muscle contractures    UTI  -previously treated UTI in September with cipro  -reports intermittent urinary frequency, though no dysuria  -will check UA today      Health Maintenance:  -reports being up to date on all vaccinations except COVID-19 series  -previous discussion with mother in regards to benefit of COVID-19 vaccine, informed that she does not need prescription  or order, can present to any pharmacy for vaccine  -order for commode and wheelchair sent to [facility]        Return to clinic in 3 months.     Delmer Schmidt, DO  [facility] Internal Medicine PGY3

## 2022-11-30 NOTE — LETTER
November 30, 2022    [facility]  [address]  [place] [address]  Fax: [phone]             [facility]  [address]  [place] [address]  Phone: [phone] To whom it may concern,    Ms. Abhay Luna is under my care.  Her mother, Diane Luna, is her sole caregiver, providing 24 hour care.    Jury duty service for Ms. Luna would cause undue harm for my patient Ms. Blank, as she requires 24 hour assistance and care.      If you have any questions or concerns, please don't hesitate to call.    Sincerely,        Delmer Schmidt, DO  [facility] Internal Medicine

## 2022-12-01 NOTE — PROGRESS NOTES
I have reviewed and concur with the resident's history, physical, assessment, and plan.  I have discussed with him all issues related to the diagnosis, workup and treatment plan.Care provided as reasonable and necessary.EDUARDO Swanson MD  [facility]

## 2022-12-02 LAB — BACTERIA UR CULT: NORMAL

## 2022-12-05 ENCOUNTER — TELEPHONE (OUTPATIENT)
Dept: INTERNAL MEDICINE | Facility: CLINIC | Age: 22
End: 2022-12-05
Payer: MEDICAID

## 2022-12-07 ENCOUNTER — TELEPHONE (OUTPATIENT)
Dept: INTERNAL MEDICINE | Facility: CLINIC | Age: 22
End: 2022-12-07
Payer: MEDICAID

## 2022-12-07 NOTE — TELEPHONE ENCOUNTER
Pt mother called inquiring about Rx for bedside commode and wheelchair that was supposed to be sent at pt LOV 11/30/22. Mother states Mamadou never received the RX. Fax: [phone] Thanks.

## 2022-12-09 ENCOUNTER — TELEPHONE (OUTPATIENT)
Dept: INTERNAL MEDICINE | Facility: CLINIC | Age: 22
End: 2022-12-09

## 2022-12-09 NOTE — TELEPHONE ENCOUNTER
GOOD AFTERNOON NEFTALI BURNS'S MOM IS ASKING THAT WE SEND A SCRIPT TO HER PHARMACY FOR A GLUCOMETER & SUPPLIES TO HER PHARMACY.  THE ONE SHE HAS NOW IS NOT WORKING PROPERLY.      PLEASE ADVISE.

## 2022-12-29 NOTE — TELEPHONE ENCOUNTER
GOOD AFTERNOON NEFTALI BURNS'S MOM IS ASKING THAT WE SEND A SCRIPT TO HER PHARMACY FOR A GLUCOMETER & SUPPLIES TO HER PHARMACY.  THE ONE SHE HAS NOW IS NOT WORKING PROPERLY.       PLEASE ADVISE.     This is the second time patient called

## 2023-01-20 NOTE — TELEPHONE ENCOUNTER
Pt mother has called multiple times since December with no response. Pt requesting a Rx for pt a Glucometer. Please Advise.    1 person assist/verbal cues

## 2023-01-23 DIAGNOSIS — Z79.4 DIABETES MELLITUS DUE TO UNDERLYING CONDITION WITHOUT COMPLICATION, WITH LONG-TERM CURRENT USE OF INSULIN: Primary | ICD-10-CM

## 2023-01-23 DIAGNOSIS — E08.9 DIABETES MELLITUS DUE TO UNDERLYING CONDITION WITHOUT COMPLICATION, WITH LONG-TERM CURRENT USE OF INSULIN: Primary | ICD-10-CM

## 2023-01-23 RX ORDER — INSULIN PUMP SYRINGE, 3 ML
EACH MISCELLANEOUS
Qty: 1 EACH | Refills: 0 | Status: SHIPPED | OUTPATIENT
Start: 2023-01-23 | End: 2024-01-23

## 2023-01-23 RX ORDER — LANCETS
1 EACH MISCELLANEOUS 3 TIMES DAILY
Qty: 200 EACH | Refills: 6 | Status: SHIPPED | OUTPATIENT
Start: 2023-01-23 | End: 2023-12-27 | Stop reason: SDUPTHER

## 2023-02-09 DIAGNOSIS — K86.89 PANCREATIC INSUFFICIENCY: Primary | ICD-10-CM

## 2023-02-13 RX ORDER — HYDROXYCHLOROQUINE SULFATE 200 MG/1
200 TABLET, FILM COATED ORAL DAILY
Qty: 30 TABLET | Refills: 1 | Status: SHIPPED | OUTPATIENT
Start: 2023-02-13

## 2023-02-28 ENCOUNTER — CLINICAL SUPPORT (OUTPATIENT)
Dept: PEDIATRICS | Facility: CLINIC | Age: 23
End: 2023-02-28
Payer: MEDICAID

## 2023-02-28 VITALS — WEIGHT: 74.5 LBS | BODY MASS INDEX: 14.08 KG/M2

## 2023-02-28 DIAGNOSIS — Z95.828 PORT-A-CATH IN PLACE: Primary | ICD-10-CM

## 2023-02-28 PROCEDURE — 96372 THER/PROPH/DIAG INJ SC/IM: CPT | Mod: PBBFAC,PN

## 2023-02-28 PROCEDURE — 99213 OFFICE O/P EST LOW 20 MIN: CPT | Mod: PBBFAC,25,PN

## 2023-02-28 RX ORDER — HEPARIN 100 UNIT/ML
100 SYRINGE INTRAVENOUS
Status: COMPLETED | OUTPATIENT
Start: 2023-02-28 | End: 2023-02-28

## 2023-02-28 RX ADMIN — HEPARIN 100 UNITS: 100 SYRINGE at 12:02

## 2023-02-28 NOTE — PROGRESS NOTES
Here for mediport flush.  Mediport accessed using 19g hubner needle using sterile technique.  Flushed with NS and heparinized saline 100units/ml.  Tolerated procedure well.

## 2023-03-23 ENCOUNTER — OFFICE VISIT (OUTPATIENT)
Dept: INTERNAL MEDICINE | Facility: CLINIC | Age: 23
End: 2023-03-23
Payer: MEDICAID

## 2023-03-23 VITALS
RESPIRATION RATE: 16 BRPM | TEMPERATURE: 98 F | SYSTOLIC BLOOD PRESSURE: 111 MMHG | WEIGHT: 74 LBS | BODY MASS INDEX: 13.98 KG/M2 | DIASTOLIC BLOOD PRESSURE: 66 MMHG | HEART RATE: 88 BPM

## 2023-03-23 DIAGNOSIS — Z79.4 DIABETES MELLITUS DUE TO UNDERLYING CONDITION WITHOUT COMPLICATION, WITH LONG-TERM CURRENT USE OF INSULIN: Primary | ICD-10-CM

## 2023-03-23 DIAGNOSIS — E08.9 DIABETES MELLITUS DUE TO UNDERLYING CONDITION WITHOUT COMPLICATION, WITH LONG-TERM CURRENT USE OF INSULIN: Primary | ICD-10-CM

## 2023-03-23 DIAGNOSIS — K86.89 PANCREATIC INSUFFICIENCY: ICD-10-CM

## 2023-03-23 DIAGNOSIS — E84.0 CYSTIC FIBROSIS OF THE LUNG: ICD-10-CM

## 2023-03-23 PROCEDURE — 99214 OFFICE O/P EST MOD 30 MIN: CPT | Mod: PBBFAC

## 2023-03-23 RX ORDER — INSULIN GLARGINE 300 U/ML
57 INJECTION, SOLUTION SUBCUTANEOUS DAILY
Qty: 1 PEN | Refills: 11 | Status: SHIPPED | OUTPATIENT
Start: 2023-03-23 | End: 2023-07-10 | Stop reason: SDUPTHER

## 2023-03-23 RX ORDER — AZITHROMYCIN 500 MG/1
TABLET, FILM COATED ORAL
Qty: 30 TABLET | Refills: 11 | Status: SHIPPED | OUTPATIENT
Start: 2023-03-23 | End: 2023-07-10 | Stop reason: SDUPTHER

## 2023-03-23 RX ORDER — PANCRELIPASE 24000; 76000; 120000 [USP'U]/1; [USP'U]/1; [USP'U]/1
1 CAPSULE, DELAYED RELEASE PELLETS ORAL
Qty: 120 CAPSULE | Refills: 11 | Status: SHIPPED | OUTPATIENT
Start: 2023-03-23 | End: 2023-07-10 | Stop reason: SDUPTHER

## 2023-03-23 RX ORDER — INSULIN LISPRO 100 [IU]/ML
14 INJECTION, SOLUTION INTRAVENOUS; SUBCUTANEOUS
Qty: 3 EACH | Refills: 11 | Status: SHIPPED | OUTPATIENT
Start: 2023-03-23 | End: 2023-07-10 | Stop reason: SDUPTHER

## 2023-03-23 NOTE — PROGRESS NOTES
[facility] INTERNAL MEDICINE  OUTPATIENT OFFICE VISIT NOTE    SUBJECTIVE:   Chief Complaint: Routine follow up    HPI: Ms. 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  Also follows with rheumatologist, Dr. oTny, at [facility] and [facility] cystic fibrosis pulmonology Clinic in [place].  Has had a significant rheumatology workup, previously on Humira, Enbrel, chronic prednisone therapy, methotrexate.  Previous rheumatologist had referred her to Orthopedics Clinic in [place] with concerns for end-stage osteoarthritis of bilateral knees, was seen in [place], determined not to be good surgical candidate with recommendations for Botox therapy to relax bilateral contractures.  Follows with her pulmonolgist in [place] approx every 3 months.       Patient is essentially wheel chair bound.  Has J-tube in place, though primary nutrition from oral feeds.  J-tube used for supplemental nutrition with things like milk, providing extra calories.  Enjoys watching TV, visiting with her family.  Family provides nearly 24 hour care as needed.  Great family support system.     Last seen in clinic in November, seen today for routine f/u.  Has no acute complaints, doing well.  Saw rheumatology yesterday.        Past Medical History:   Diagnosis Date    Cystic fibrosis     G tube feedings     Rheumatoid arthritis        Past Surgical History:   Procedure Laterality Date    MEDIPORT INSERTION, SINGLE         Social History     Socioeconomic History    Marital status: Single   Tobacco Use    Smoking status: Never    Smokeless tobacco: Never   Substance and Sexual Activity    Alcohol use: Never    Drug use: Never    Sexual activity: Not Currently      Social Determinants of Health     Financial Resource Strain: Low Risk     Difficulty of Paying Living Expenses: Not very hard   Food Insecurity: No Food Insecurity    Worried About Running Out of Food in the Last Year: Never true    Ran Out of Food in the Last Year: Never true   Transportation Needs: Unmet Transportation Needs    Lack of Transportation (Medical): Yes    Lack of Transportation (Non-Medical): Yes   Physical Activity: Inactive    Days of Exercise per Week: 0 days    Minutes of Exercise per Session: 0 min   Stress: No Stress Concern Present    Feeling of Stress : Not at all   Social Connections: Unknown    Frequency of Communication with Friends and Family: Three times a week    Frequency of Social Gatherings with Friends and Family: Never    Active Member of Clubs or Organizations: No    Attends Club or Organization Meetings: Never    Marital Status: Never    Housing Stability: Low Risk     Unable to Pay for Housing in the Last Year: No    Number of Places Lived in the Last Year: 1    Unstable Housing in the Last Year: No           Review of Systems   Constitutional:  Negative for chills, fever, malaise/fatigue and weight loss.   Respiratory:  Negative for cough, sputum production and shortness of breath.    Cardiovascular:  Negative for chest pain, orthopnea and leg swelling.   Gastrointestinal:  Negative for abdominal pain, constipation, diarrhea, nausea and vomiting.   Genitourinary:  Negative for dysuria and urgency.   Musculoskeletal:  Negative for back pain and joint pain.   Neurological:  Negative for sensory change and focal weakness.           OBJECTIVE:     Vital signs:   /66 (BP Location: Right arm, Patient Position: Sitting, BP Method: Pediatric (Automatic))   Pulse 88   Temp 97.7 °F (36.5 °C) (Oral)   Resp 16   Wt 33.6 kg (74 lb)   BMI 13.98 kg/m²      Physical Examination:  General: thin appearing female w/o distress  Pulm: CTA bilaterally, normal work of  "breathing  CV: S1, S2 w/o murmurs or gallops; no edema noted  GI: Soft, non-tender to palpation  MSK: ambulates with wheelchair   Neuro: AAOx4;motor/sensory function intact, no gross motor deficits noted on exam   Psych: Cooperative; appropriate mood and affect, answers all questions appropriately       Current Outpatient Medications   Medication Instructions    albuterol (PROVENTIL/VENTOLIN HFA) 90 mcg/actuation inhaler USE 2 PUFFS BY MOUTH TWICE DAILY PRIOR TO HYPERSAL TX & EVERY 4HRS AS NEEDED COUGH/WHEEZING    anakinra (KINERET) 100 mg, Subcutaneous    azithromycin (ZITHROMAX) 500 MG tablet See Instructions, 1 tab(s) Oral Monday, Wednesday and Friday, # 13 tab(s), 5 Refill(s), Pharmacy: [facility], 152.4, cm, Height/Length Dosing, 12/07/21 13:16:00 CST, 32.4, kg, Weight Dosing, 03/10/22 11:00:00 CST    BD ULTRA-FINE SHORT PEN NEEDLE 31 gauge x 5/16" Jim CAMPBELL 300 mg/4 mL Nebu No dose, route, or frequency recorded.    blood sugar diagnostic Strp 1 strip, Misc.(Non-Drug; Combo Route), 3 times daily    blood-glucose meter kit Use as instructed    cholecalciferol (vitamin D3) (VITAMIN D3) 1,000 Units, Oral, Daily    CREON 24,000-76,000 -120,000 unit capsule 1 capsule, Oral, 3 times daily with meals    cyproheptadine (PERIACTIN) 4 mg, Oral, Daily    denosumab (PROLIA) 60 mg, Subcutaneous    dornase shea (PULMOZYME) 2.5 mg, Inhalation    ergocalciferol (ERGOCALCIFEROL) 50,000 unit Cap No dose, route, or frequency recorded.    ferrous sulfate (FEROSUL) 220 mg, Per J Tube, Every Mon, Wed, Fri    hydrOXYchloroQUINE (PLAQUENIL) 200 mg, Oral, Daily    insulin lispro 14 Units, Subcutaneous, 3 times daily with meals    lancets (ONETOUCH ULTRASOFT LANCETS) Misc 1 each, Misc.(Non-Drug; Combo Route), 3 times daily    montelukast (SINGULAIR) 10 mg, Oral, Daily    naproxen (NAPROSYN) 375 mg, Oral, Daily PRN    OTREXUP, PF, 25 mg/0.4 mL AtIn INJECT 25mg UNDER THE SKIN EVERY WEEK    sodium chloride 7% 7 " % nebulizer solution Nebulization, As needed (PRN)    TOUJEO MAX U-300 SOLOSTAR 58 Units, Subcutaneous, Daily    TRIKAFTA 100-50-75 mg(d) /150 mg (n) TbSQ No dose, route, or frequency recorded.         ASSESSMENT & PLAN:   Cystic fibrosis  -follows with [facility] cystic fibrosis Clinic, regular follow ups approx every 3 months  -continue azithromycin daily  -continue Trikafta (Elexacaftor/tezacaftor/ivacaftor) for CF  -continue Pulmozyme neb  -continue Bethkis (Tobramycin)     Pancreatic insufficiency in setting of CF  Diabetes Mellitus related to above  -last A1c 5.4 on 03/22, well controlled, ordered repeat A1c today, having labs completed today   -continue Creon as it relates to pancreatic insuffiency  -continue Toujeo, takes approximately 57-58 units in BID dosing  -continue lispro 14 U TIDWM     RF+ Rheumatoid Arthritis  -follows with Dr. Tony at [facility], previously followed with Dr. Canas at [facility]  -continue methotrexate  -continue hydroxychloroquine      Osteoporosis  -continue Prolia, prescribed by Rheum, most recent injection yesterday      Bilateral knee contractures  -evaluated by Orthopedic surgery [place], deemed not to be good surgical candidate and regards to bilateral knee replacements  -awaiting Botox treatment in regards to bilateral knee muscle contractures       Health Maintenance:  -reports being up to date on all vaccinations except COVID-19 series  -labs today including CBC, A1c       Labs today   Return to clinic in 3 months.     Delmer Schmidt, DO  [facility] Internal Medicine PGY3

## 2023-04-05 ENCOUNTER — TELEPHONE (OUTPATIENT)
Dept: INTERNAL MEDICINE | Facility: CLINIC | Age: 23
End: 2023-04-05
Payer: MEDICAID

## 2023-04-14 ENCOUNTER — TELEPHONE (OUTPATIENT)
Dept: INTERNAL MEDICINE | Facility: CLINIC | Age: 23
End: 2023-04-14
Payer: MEDICAID

## 2023-06-27 ENCOUNTER — PATIENT MESSAGE (OUTPATIENT)
Dept: RESEARCH | Facility: HOSPITAL | Age: 23
End: 2023-06-27
Payer: MEDICAID

## 2023-07-10 ENCOUNTER — PATIENT MESSAGE (OUTPATIENT)
Dept: ADMINISTRATIVE | Facility: HOSPITAL | Age: 23
End: 2023-07-10
Payer: MEDICAID

## 2023-07-10 ENCOUNTER — OFFICE VISIT (OUTPATIENT)
Dept: INTERNAL MEDICINE | Facility: CLINIC | Age: 23
End: 2023-07-10
Payer: MEDICAID

## 2023-07-10 VITALS
DIASTOLIC BLOOD PRESSURE: 79 MMHG | HEIGHT: 61 IN | TEMPERATURE: 100 F | RESPIRATION RATE: 18 BRPM | WEIGHT: 74 LBS | SYSTOLIC BLOOD PRESSURE: 113 MMHG | BODY MASS INDEX: 13.97 KG/M2 | HEART RATE: 120 BPM

## 2023-07-10 DIAGNOSIS — E84.0 CYSTIC FIBROSIS OF THE LUNG: ICD-10-CM

## 2023-07-10 DIAGNOSIS — M24.561 BILATERAL KNEE CONTRACTURES: ICD-10-CM

## 2023-07-10 DIAGNOSIS — Z79.4 DIABETES MELLITUS DUE TO UNDERLYING CONDITION WITHOUT COMPLICATION, WITH LONG-TERM CURRENT USE OF INSULIN: ICD-10-CM

## 2023-07-10 DIAGNOSIS — Z23 NEED FOR DIPHTHERIA-TETANUS-PERTUSSIS (TDAP) VACCINE: Primary | ICD-10-CM

## 2023-07-10 DIAGNOSIS — M24.562 BILATERAL KNEE CONTRACTURES: ICD-10-CM

## 2023-07-10 DIAGNOSIS — K86.89 PANCREATIC INSUFFICIENCY: ICD-10-CM

## 2023-07-10 DIAGNOSIS — E08.9 DIABETES MELLITUS DUE TO UNDERLYING CONDITION WITHOUT COMPLICATION, WITH LONG-TERM CURRENT USE OF INSULIN: ICD-10-CM

## 2023-07-10 PROCEDURE — 90715 TDAP VACCINE 7 YRS/> IM: CPT | Mod: PBBFAC

## 2023-07-10 PROCEDURE — 90471 IMMUNIZATION ADMIN: CPT | Mod: PBBFAC

## 2023-07-10 PROCEDURE — 99214 OFFICE O/P EST MOD 30 MIN: CPT | Mod: PBBFAC

## 2023-07-10 RX ORDER — NAPROXEN 375 MG/1
375 TABLET ORAL DAILY PRN
Qty: 30 TABLET | Refills: 2 | Status: SHIPPED | OUTPATIENT
Start: 2023-07-10 | End: 2023-12-27 | Stop reason: SDUPTHER

## 2023-07-10 RX ORDER — AZITHROMYCIN 500 MG/1
TABLET, FILM COATED ORAL
Qty: 30 TABLET | Refills: 11 | Status: SHIPPED | OUTPATIENT
Start: 2023-07-10

## 2023-07-10 RX ORDER — INSULIN LISPRO 100 [IU]/ML
14 INJECTION, SOLUTION INTRAVENOUS; SUBCUTANEOUS
Qty: 3 EACH | Refills: 11 | Status: SHIPPED | OUTPATIENT
Start: 2023-07-10 | End: 2023-09-22 | Stop reason: SDUPTHER

## 2023-07-10 RX ORDER — INSULIN GLARGINE 300 U/ML
57 INJECTION, SOLUTION SUBCUTANEOUS DAILY
Qty: 1 PEN | Refills: 11 | Status: SHIPPED | OUTPATIENT
Start: 2023-07-10 | End: 2023-08-24 | Stop reason: SDUPTHER

## 2023-07-10 RX ORDER — PANCRELIPASE 24000; 76000; 120000 [USP'U]/1; [USP'U]/1; [USP'U]/1
1 CAPSULE, DELAYED RELEASE PELLETS ORAL
Qty: 120 CAPSULE | Refills: 11 | Status: SHIPPED | OUTPATIENT
Start: 2023-07-10 | End: 2023-12-27 | Stop reason: SDUPTHER

## 2023-07-10 RX ADMIN — TETANUS TOXOID, REDUCED DIPHTHERIA TOXOID AND ACELLULAR PERTUSSIS VACCINE, ADSORBED 0.5 ML: 5; 2.5; 8; 8; 2.5 SUSPENSION INTRAMUSCULAR at 09:07

## 2023-07-10 NOTE — PROGRESS NOTES
[facility] INTERNAL MEDICINE  OUTPATIENT OFFICE VISIT NOTE    SUBJECTIVE:   Chief Complaint: Routine follow up    HPI: Ms. Luna is a 22 yof with PMHx significant for cystic fibrosis, RF + rheumatoid arthritis, cystic fibrosis related pancreatic insufficiency and diabetes mellitus, protein/calorie malnutrition s/p J-tube placement who presents to [facility] IM clinic with her mother for routine followup.  She was last seen on 3/23/23. She follows with Rheumatology, Dr. Tony at [facility] in [place].  She follows with [facility] for cystic fibrosis every 3 months, next appointment is 7/29/23.  She states that she is compliant with her medications.  Her Rheumatologist recently discontinued folic acid.  She has a J-tube in place and is wheelchair bound.  She receives 2 feedings per day via J-tube and gets the remainder of her nutrition via oral feedings.      She denies any headaches, dizziness, shortness of breath, chest pain, abdominal pain, n/v/d.           Past Medical History:   Diagnosis Date    Cystic fibrosis     G tube feedings     Rheumatoid arthritis        Past Surgical History:   Procedure Laterality Date    MEDIPORT INSERTION, SINGLE         Social History     Socioeconomic History    Marital status: Single   Tobacco Use    Smoking status: Never    Smokeless tobacco: Never   Substance and Sexual Activity    Alcohol use: Never    Drug use: Never    Sexual activity: Not Currently           Review of Systems   Constitutional:  Negative for chills, fever, malaise/fatigue and weight loss.   Respiratory:  Negative for cough, sputum production and shortness of breath.    Cardiovascular:  Negative for chest pain, orthopnea and leg swelling.   Gastrointestinal:  Negative for abdominal pain, constipation, diarrhea, nausea and vomiting.   Genitourinary:  Negative for dysuria and urgency.   Musculoskeletal:  Negative for back pain and joint pain.   Neurological:  Negative for sensory change and focal  "weakness.           OBJECTIVE:     Vital signs:   /79 (BP Location: Right arm, Patient Position: Sitting, BP Method: Medium (Automatic))   Pulse (!) 120   Temp 99.5 °F (37.5 °C) (Oral)   Resp 18   Ht 5' 1" (1.549 m)   Wt 33.6 kg (74 lb)   BMI 13.98 kg/m²      Physical Examination:  General: thin appearing female w/o distress  Pulm: CTA bilaterally, normal work of breathing  CV: S1, S2 w/o murmurs or gallops; no edema noted  GI: Soft, non-tender to palpation, J-tube in place.  MSK: ambulates with wheelchair   Neuro: AAOx4;motor/sensory function intact, no gross motor deficits noted on exam   Psych: Cooperative; appropriate mood and affect, answers all questions appropriately       Current Outpatient Medications   Medication Instructions    albuterol (PROVENTIL/VENTOLIN HFA) 90 mcg/actuation inhaler USE 2 PUFFS BY MOUTH TWICE DAILY PRIOR TO HYPERSAL TX & EVERY 4HRS AS NEEDED COUGH/WHEEZING    anakinra (KINERET) 100 mg, Subcutaneous    azithromycin (ZITHROMAX) 500 MG tablet See Instructions, 1 tab(s) Oral Monday, Wednesday and Friday, # 13 tab(s), 5 Refill(s), Pharmacy: [facility], 152.4, cm, Height/Length Dosing, 12/07/21 13:16:00 CST, 32.4, kg, Weight Dosing, 03/10/22 11:00:00 CST    BD ULTRA-FINE SHORT PEN NEEDLE 31 gauge x 5/16" Jim CAMPBELL 300 mg/4 mL Nebu No dose, route, or frequency recorded.    blood sugar diagnostic Strp 1 strip, Misc.(Non-Drug; Combo Route), 3 times daily    blood-glucose meter kit Use as instructed    cholecalciferol (vitamin D3) (VITAMIN D3) 1,000 Units, Oral, Daily    CREON 24,000-76,000 -120,000 unit capsule 1 capsule, Oral, 3 times daily with meals    cyproheptadine (PERIACTIN) 4 mg, Oral, Daily    denosumab (PROLIA) 60 mg, Subcutaneous    dornase shea (PULMOZYME) 2.5 mg, Inhalation    ergocalciferol (ERGOCALCIFEROL) 50,000 unit Cap No dose, route, or frequency recorded.    ferrous sulfate (FEROSUL) 220 mg, Per J Tube, Every Mon, Wed, Fri    " hydrOXYchloroQUINE (PLAQUENIL) 200 mg, Oral, Daily    insulin lispro 14 Units, Subcutaneous, 3 times daily with meals    lancets (ONETOUCH ULTRASOFT LANCETS) Misc 1 each, Misc.(Non-Drug; Combo Route), 3 times daily    montelukast (SINGULAIR) 10 mg, Oral, Daily    naproxen (NAPROSYN) 375 mg, Oral, Daily PRN    OTREXUP, PF, 25 mg/0.4 mL AtIn INJECT 25mg UNDER THE SKIN EVERY WEEK    sodium chloride 7% 7 % nebulizer solution Nebulization, As needed (PRN)    TOUJEO MAX U-300 SOLOSTAR 58 Units, Subcutaneous, Daily    TRIKAFTA 100-50-75 mg(d) /150 mg (n) TbSQ No dose, route, or frequency recorded.         ASSESSMENT & PLAN:   Cystic fibrosis  -Follows with [facility] cystic fibrosis Clinic, regular follow ups every 3 months, next appointment is 07/29  -Continue azithromycin daily  -Continue Trikafta (Elexacaftor/tezacaftor/ivacaftor) for CF  -Continue Pulmozyme neb  -Continue Bethkis (Tobramycin)     Pancreatic insufficiency in setting of CF  Diabetes Mellitus related to above  -Last A1c 6.6 on 03/23/23, well controlled  -Continue Creon as it relates to pancreatic insuffiency  -Continue Toujeo, takes approximately 57-58 units in BID dosing  -Continue lispro 14 U TIDWM     RF+ Rheumatoid Arthritis  -Follows with Dr. Tony at [facility] in [place]  -Continue methotrexate  -Continue hydroxychloroquine      Osteoporosis  -Continue Prolia, prescribed by Rheum     Bilateral knee contractures  -Evaluated by Orthopedic surgery [place], deemed not to be good surgical candidate and regards to bilateral knee replacements       Health Maintenance:  -Agreed to receive Tetanus vaccine today       Return to clinic in 3 months.       Bahman Mtz MD  [facility] PGY-1

## 2023-07-11 ENCOUNTER — PATIENT MESSAGE (OUTPATIENT)
Dept: RESEARCH | Facility: HOSPITAL | Age: 23
End: 2023-07-11
Payer: MEDICAID

## 2023-07-20 ENCOUNTER — CLINICAL SUPPORT (OUTPATIENT)
Dept: PEDIATRICS | Facility: CLINIC | Age: 23
End: 2023-07-20
Payer: MEDICAID

## 2023-07-20 VITALS — WEIGHT: 78.5 LBS | BODY MASS INDEX: 14.83 KG/M2

## 2023-07-20 NOTE — PROGRESS NOTES
Here for mediport flush to right chest wall. Accessed using sterile technique with 20g hubner needle.  Good blood return noted, flushed with normal saline followed by 5 ml of the 100units/ml heparinized saline.  Tolerated procedure well.

## 2023-08-22 ENCOUNTER — TELEPHONE (OUTPATIENT)
Dept: INTERNAL MEDICINE | Facility: CLINIC | Age: 23
End: 2023-08-22
Payer: MEDICAID

## 2023-08-22 NOTE — TELEPHONE ENCOUNTER
Dr Mtz,     Pts ins does not cover Toujeo Max. It does cover the following:    Lantus Solostar  Levimir  Levemir FlexPen  NovoLOG FlexPen  Victoza    Do you want to prescribe one of these?    Please advise.

## 2023-08-24 DIAGNOSIS — Z79.4 DIABETES MELLITUS DUE TO UNDERLYING CONDITION WITHOUT COMPLICATION, WITH LONG-TERM CURRENT USE OF INSULIN: ICD-10-CM

## 2023-08-24 DIAGNOSIS — E08.9 DIABETES MELLITUS DUE TO UNDERLYING CONDITION WITHOUT COMPLICATION, WITH LONG-TERM CURRENT USE OF INSULIN: ICD-10-CM

## 2023-08-30 NOTE — TELEPHONE ENCOUNTER
----- Message from Denita Vasquez sent at 11/30/2017 11:35 AM CST -----  Contact: Patient  Sammi, patient [phone] patient states she is coughing up blood, running low fever. Transferred patient to on call. Please advise. Thanks.   Will do PA for this med. Will inform you when it is completed.

## 2023-09-07 RX ORDER — INSULIN GLARGINE 300 U/ML
57 INJECTION, SOLUTION SUBCUTANEOUS DAILY
Qty: 1 PEN | Refills: 11 | Status: SHIPPED | OUTPATIENT
Start: 2023-09-07 | End: 2023-12-27 | Stop reason: SDUPTHER

## 2023-09-22 DIAGNOSIS — E08.9 DIABETES MELLITUS DUE TO UNDERLYING CONDITION WITHOUT COMPLICATION, WITH LONG-TERM CURRENT USE OF INSULIN: ICD-10-CM

## 2023-09-22 DIAGNOSIS — Z79.4 DIABETES MELLITUS DUE TO UNDERLYING CONDITION WITHOUT COMPLICATION, WITH LONG-TERM CURRENT USE OF INSULIN: ICD-10-CM

## 2023-09-26 RX ORDER — INSULIN LISPRO 100 [IU]/ML
14 INJECTION, SOLUTION INTRAVENOUS; SUBCUTANEOUS
Qty: 3 EACH | Refills: 11 | Status: SHIPPED | OUTPATIENT
Start: 2023-09-26 | End: 2023-12-27 | Stop reason: SDUPTHER

## 2023-10-16 ENCOUNTER — PATIENT MESSAGE (OUTPATIENT)
Dept: ADMINISTRATIVE | Facility: HOSPITAL | Age: 23
End: 2023-10-16
Payer: MEDICAID

## 2023-10-30 ENCOUNTER — CLINICAL SUPPORT (OUTPATIENT)
Dept: PEDIATRICS | Facility: CLINIC | Age: 23
End: 2023-10-30
Payer: MEDICAID

## 2023-10-30 DIAGNOSIS — Z95.828 PORT-A-CATH IN PLACE: Primary | ICD-10-CM

## 2023-10-30 PROCEDURE — 99211 OFF/OP EST MAY X REQ PHY/QHP: CPT | Mod: PBBFAC,PN

## 2023-10-30 PROCEDURE — 96372 THER/PROPH/DIAG INJ SC/IM: CPT | Mod: PBBFAC,PN

## 2023-10-30 RX ORDER — HEPARIN SODIUM (PORCINE) LOCK FLUSH IV SOLN 100 UNIT/ML 100 UNIT/ML
100 SOLUTION INTRAVENOUS
Status: COMPLETED | OUTPATIENT
Start: 2023-10-30 | End: 2023-10-30

## 2023-10-30 RX ADMIN — Medication 500 UNITS: at 08:10

## 2023-12-27 ENCOUNTER — OFFICE VISIT (OUTPATIENT)
Dept: INTERNAL MEDICINE | Facility: CLINIC | Age: 23
End: 2023-12-27
Payer: MEDICAID

## 2023-12-27 VITALS
SYSTOLIC BLOOD PRESSURE: 105 MMHG | BODY MASS INDEX: 14.35 KG/M2 | DIASTOLIC BLOOD PRESSURE: 73 MMHG | OXYGEN SATURATION: 95 % | HEART RATE: 112 BPM | WEIGHT: 76 LBS | HEIGHT: 61 IN | RESPIRATION RATE: 20 BRPM | TEMPERATURE: 98 F

## 2023-12-27 DIAGNOSIS — E84.0 CYSTIC FIBROSIS OF THE LUNG: Primary | ICD-10-CM

## 2023-12-27 DIAGNOSIS — M24.562 BILATERAL KNEE CONTRACTURES: ICD-10-CM

## 2023-12-27 DIAGNOSIS — E08.9 DIABETES MELLITUS DUE TO UNDERLYING CONDITION WITHOUT COMPLICATION, WITH LONG-TERM CURRENT USE OF INSULIN: ICD-10-CM

## 2023-12-27 DIAGNOSIS — E46 MALNUTRITION RELATED TO CHRONIC DISEASE: ICD-10-CM

## 2023-12-27 DIAGNOSIS — E55.9 VITAMIN D DEFICIENCY: Chronic | ICD-10-CM

## 2023-12-27 DIAGNOSIS — Z79.4 DIABETES MELLITUS DUE TO UNDERLYING CONDITION WITHOUT COMPLICATION, WITH LONG-TERM CURRENT USE OF INSULIN: ICD-10-CM

## 2023-12-27 DIAGNOSIS — M24.561 BILATERAL KNEE CONTRACTURES: ICD-10-CM

## 2023-12-27 DIAGNOSIS — K86.89 PANCREATIC INSUFFICIENCY: ICD-10-CM

## 2023-12-27 DIAGNOSIS — M06.9 RHEUMATOID ARTHRITIS, INVOLVING UNSPECIFIED SITE, UNSPECIFIED WHETHER RHEUMATOID FACTOR PRESENT: ICD-10-CM

## 2023-12-27 PROCEDURE — 99214 OFFICE O/P EST MOD 30 MIN: CPT | Mod: PBBFAC

## 2023-12-27 RX ORDER — MONTELUKAST SODIUM 10 MG/1
10 TABLET ORAL DAILY
Qty: 90 TABLET | Refills: 6 | Status: SHIPPED | OUTPATIENT
Start: 2023-12-27

## 2023-12-27 RX ORDER — FERROUS SULFATE 220 (44)/5
5 ELIXIR ORAL
Qty: 473 ML | Refills: 6 | Status: SHIPPED | OUTPATIENT
Start: 2023-12-27

## 2023-12-27 RX ORDER — PANCRELIPASE 24000; 76000; 120000 [USP'U]/1; [USP'U]/1; [USP'U]/1
1 CAPSULE, DELAYED RELEASE PELLETS ORAL
Qty: 120 CAPSULE | Refills: 11 | Status: SHIPPED | OUTPATIENT
Start: 2023-12-27

## 2023-12-27 RX ORDER — VIT C/E/ZN/COPPR/LUTEIN/ZEAXAN 250MG-90MG
1000 CAPSULE ORAL DAILY
Qty: 90 CAPSULE | Refills: 6 | Status: SHIPPED | OUTPATIENT
Start: 2023-12-27

## 2023-12-27 RX ORDER — ALBUTEROL SULFATE 90 UG/1
AEROSOL, METERED RESPIRATORY (INHALATION)
Qty: 18 G | Refills: 6 | Status: SHIPPED | OUTPATIENT
Start: 2023-12-27

## 2023-12-27 RX ORDER — INSULIN LISPRO 100 [IU]/ML
14 INJECTION, SOLUTION INTRAVENOUS; SUBCUTANEOUS
Qty: 3 EACH | Refills: 11 | Status: SHIPPED | OUTPATIENT
Start: 2023-12-27 | End: 2024-03-20 | Stop reason: SDUPTHER

## 2023-12-27 RX ORDER — LANCETS
1 EACH MISCELLANEOUS 3 TIMES DAILY
Qty: 200 EACH | Refills: 6 | Status: SHIPPED | OUTPATIENT
Start: 2023-12-27 | End: 2024-03-20 | Stop reason: SDUPTHER

## 2023-12-27 RX ORDER — NAPROXEN 375 MG/1
375 TABLET ORAL DAILY PRN
Qty: 30 TABLET | Refills: 2 | Status: SHIPPED | OUTPATIENT
Start: 2023-12-27

## 2023-12-27 RX ORDER — PEN NEEDLE, DIABETIC 31 GX5/16"
NEEDLE, DISPOSABLE MISCELLANEOUS
Qty: 100 EACH | Refills: 6 | Status: SHIPPED | OUTPATIENT
Start: 2023-12-27 | End: 2024-03-20 | Stop reason: SDUPTHER

## 2023-12-27 RX ORDER — INSULIN GLARGINE 300 U/ML
57 INJECTION, SOLUTION SUBCUTANEOUS DAILY
Qty: 1 PEN | Refills: 11 | Status: SHIPPED | OUTPATIENT
Start: 2023-12-27 | End: 2024-03-20 | Stop reason: SDUPTHER

## 2023-12-27 NOTE — PROGRESS NOTES
[facility] INTERNAL MEDICINE  OUTPATIENT OFFICE VISIT NOTE    SUBJECTIVE:   Chief Complaint: Routine follow up    HPI: Ms. Luna is a 22 yof with PMHx significant for cystic fibrosis, RF + rheumatoid arthritis, cystic fibrosis related pancreatic insufficiency and diabetes mellitus, protein/calorie malnutrition s/p J-tube placement who presents to [facility] IM clinic with her mother for routine followup.  Patient states she has been doing well since her last visit.  Diabetes has been well controlled, A1c has been at range in no recent insulin adjustments have been made.  Continues to follow with rheumatology for RF, states she is stable on current medicines.  Recently had J-tube changed, no issues with this since.  Continues to tolerate tube feedings well.  No other acute concerns at this time, needs medications refilled       Past Medical History:   Diagnosis Date    Cystic fibrosis     G tube feedings     Rheumatoid arthritis        Past Surgical History:   Procedure Laterality Date    MEDIPORT INSERTION, SINGLE         Social History     Socioeconomic History    Marital status: Single   Tobacco Use    Smoking status: Never    Smokeless tobacco: Never   Substance and Sexual Activity    Alcohol use: Never    Drug use: Never    Sexual activity: Not Currently     Social Determinants of Health     Financial Resource Strain: Low Risk  (5/16/2022)    Overall Financial Resource Strain (CARDIA)     Difficulty of Paying Living Expenses: Not very hard   Food Insecurity: No Food Insecurity (5/16/2022)    Hunger Vital Sign     Worried About Running Out of Food in the Last Year: Never true     Ran Out of Food in the Last Year: Never true   Transportation Needs: Unmet Transportation Needs (5/16/2022)    PRAPARE - Transportation     Lack of Transportation (Medical): Yes     Lack of Transportation (Non-Medical): Yes   Physical Activity: Inactive (5/16/2022)    Exercise Vital Sign     Days of Exercise per Week: 0 days     Minutes of  "Exercise per Session: 0 min   Stress: No Stress Concern Present (5/16/2022)    Citizen of [place] of Occupational Health - Occupational Stress Questionnaire     Feeling of Stress : Not at all   Social Connections: Unknown (5/16/2022)    Social Connection and Isolation Panel [NHANES]     Frequency of Communication with Friends and Family: Three times a week     Frequency of Social Gatherings with Friends and Family: Never     Active Member of Clubs or Organizations: No     Attends Club or Organization Meetings: Never     Marital Status: Never    Housing Stability: Low Risk  (5/16/2022)    Housing Stability Vital Sign     Unable to Pay for Housing in the Last Year: No     Number of Places Lived in the Last Year: 1     Unstable Housing in the Last Year: No     Review of Systems   Constitutional:  Negative for chills and fever.   Respiratory:  Negative for cough and shortness of breath.    Cardiovascular:  Negative for chest pain and leg swelling.   Gastrointestinal:  Negative for abdominal pain, nausea and vomiting.   Genitourinary:  Negative for dysuria.   Musculoskeletal:  Positive for joint pain. Negative for back pain.   Neurological:  Negative for sensory change and focal weakness.        OBJECTIVE:     Vital signs:   /73 (BP Location: Right arm, Patient Position: Sitting, BP Method: Medium (Automatic))   Pulse (!) 112   Temp 98.3 °F (36.8 °C)   Resp 20   Ht 5' 1" (1.549 m)   Wt 34.5 kg (76 lb)   SpO2 95%   BMI 14.36 kg/m²      Physical Examination:  General: thin appearing female w/o distress  Pulm: CTA bilaterally, normal work of breathing  CV: S1, S2 w/o murmurs or gallops  GI: Soft, non-tender to palpation, J-tube in place.  MSK: ambulates with wheelchair   Neuro: AAOx4;motor/sensory function intact, no gross motor deficits noted on exam     Current Outpatient Medications   Medication Instructions    albuterol (PROVENTIL/VENTOLIN HFA) 90 mcg/actuation inhaler USE 2 PUFFS BY MOUTH TWICE DAILY " "PRIOR TO HYPERSAL TX & EVERY 4HRS AS NEEDED COUGH/WHEEZING    azithromycin (ZITHROMAX) 500 MG tablet See Instructions, 1 tab(s) Oral Monday, Wednesday and Friday, # 13 tab(s), 5 Refill(s), Pharmacy: [facility], 152.4, cm, Height/Length Dosing, 12/07/21 13:16:00 CST, 32.4, kg, Weight Dosing, 03/10/22 11:00:00 CST    BD ULTRA-FINE SHORT PEN NEEDLE 31 gauge x 5/16" Jim CAMPBELL 300 mg/4 mL Nebu No dose, route, or frequency recorded.    blood sugar diagnostic Strp 1 strip, Misc.(Non-Drug; Combo Route), 3 times daily    blood-glucose meter kit Use as instructed    cholecalciferol (vitamin D3) (VITAMIN D3) 1,000 Units, Oral, Daily    CREON 24,000-76,000 -120,000 unit capsule 1 capsule, Oral, 3 times daily with meals    cyproheptadine (PERIACTIN) 4 mg, Oral, Daily    denosumab (PROLIA) 60 mg, Subcutaneous    dornase shea (PULMOZYME) 2.5 mg, Inhalation    ferrous sulfate (FEROSUL) 220 mg, Per J Tube, Every Mon, Wed, Fri    hydroxychloroquine (PLAQUENIL) 200 mg, Oral, Daily    insulin lispro 14 Units, Subcutaneous, 3 times daily with meals    KINERET 100 mg/0.67 mL injection Inject 0.67 ml (100 mg) under the skin once daily.    KINERET 100 mg, Subcutaneous, Daily    lancets (ONETOUCH ULTRASOFT LANCETS) Misc 1 each, Misc.(Non-Drug; Combo Route), 3 times daily    montelukast (SINGULAIR) 10 mg, Oral, Daily    naproxen (NAPROSYN) 375 mg, Oral, Daily PRN    sodium chloride 7% 7 % nebulizer solution Nebulization, As needed (PRN)    TOUJEO MAX U-300 SOLOSTAR 58 Units, Subcutaneous, Daily    TRIKAFTA 100-50-75 mg(d) /150 mg (n) TbSQ No dose, route, or frequency recorded.       ASSESSMENT & PLAN:   Cystic fibrosis  -Follows with [facility] cystic fibrosis Clinic, regular follow ups every 3 months,   -Continue azithromycin daily, Trikafta (Elexacaftor/tezacaftor/ivacaftor), Pulmozyme neb  -Continue Bethkis (Tobramycin)     Pancreatic insufficiency in setting of CF  Diabetes Mellitus related to above  -Last A1c 6.6 " on 03/23/23, well controlled  -Continue Creon as it relates to pancreatic insuffiency  -Continue Toujeo, takes approximately 57-58 units in BID dosing  -Continue lispro 14 U TIDWM     RF+ Rheumatoid Arthritis  -Follows with Dr. Tony at [facility] in [place]  -Continue methotrexate  -Continue hydroxychloroquine      Osteoporosis  -Continue Prolia     Bilateral knee contractures  -Evaluated by Orthopedic surgery [place], deemed not to be good surgical candidate       Health Maintenance:  -Agreed to receive Tetanus vaccine today       Flu shot and Prevnar 20 given in clinic today   Refilled medications as appropriate  Labs before next visit     [facility]

## 2024-02-19 ENCOUNTER — CLINICAL SUPPORT (OUTPATIENT)
Dept: PEDIATRICS | Facility: CLINIC | Age: 24
End: 2024-02-19
Payer: MEDICAID

## 2024-02-19 VITALS — WEIGHT: 67 LBS | BODY MASS INDEX: 12.66 KG/M2

## 2024-02-19 DIAGNOSIS — Z95.828 PORT-A-CATH IN PLACE: Primary | ICD-10-CM

## 2024-02-19 PROCEDURE — 99213 OFFICE O/P EST LOW 20 MIN: CPT | Mod: PBBFAC,PN

## 2024-02-19 NOTE — PROGRESS NOTES
Here for mediport flush using sterile technique accessed with 20g hubner needle.  Flushed with 10 ml normal saline followed by 5ml heparinized saline 100units/ml.  Tolerated procedure well.

## 2024-03-19 NOTE — PROGRESS NOTES
[facility] INTERNAL MEDICINE  OUTPATIENT OFFICE VISIT NOTE    SUBJECTIVE:   Chief Complaint: Routine follow up    HPI: Ms. Luna is a 22 yof with PMHx significant for cystic fibrosis, RF + rheumatoid arthritis, cystic fibrosis related pancreatic insufficiency and diabetes mellitus, protein/calorie malnutrition s/p J-tube placement who presents to [facility] IM clinic with her mother for routine followup.  She was last seen on 12/27/2023. She follows with Rheumatology, Dr. Tony at [facility] in [place].  She follows with [facility] for cystic fibrosis every 3 months.     Patient has MediPort, states that her nurse recently retired in needs new nurse for routine flushes.  Most recent A1c 1 month ago 11.5.  This appears to be an outlier compared to previous A1cs, which typically run 5.5-7.5.  Endorses compliance with all of her medications, states that sugar typically runs 100s-120s.  No recent sickness or hospitalizations.       Constitutional: no fever/chills  EENT: no sore throat, ear pain, sinus pain/congestion, nasal congestion/drainage  Respiratory: no cough, no wheezing, no shortness of breath  Cardiovascular: no chest pain, no palpitations, no edema  Gastrointestinal: no nausea, vomiting, or diarrhea. No abdominal pain  Genitourinary: no dysuria, no urinary frequency or urgency, no hematuria  Integumentary: no skin rash or abnormal lesion  Neurologic: no headache, no dizziness, no weakness or numbness        Past Medical History:   Diagnosis Date    Cystic fibrosis     G tube feedings     Rheumatoid arthritis        Past Surgical History:   Procedure Laterality Date    MEDIPORT INSERTION, SINGLE         Social History     Socioeconomic History    Marital status: Single   Tobacco Use    Smoking status: Never    Smokeless tobacco: Never   Substance and Sexual Activity    Alcohol use: Never    Drug use: Never    Sexual activity: Not Currently     Social Determinants of Health     Financial Resource  Strain: Low Risk  (5/16/2022)    Overall Financial Resource Strain (CARDIA)     Difficulty of Paying Living Expenses: Not very hard   Food Insecurity: No Food Insecurity (5/16/2022)    Hunger Vital Sign     Worried About Running Out of Food in the Last Year: Never true     Ran Out of Food in the Last Year: Never true   Transportation Needs: Unmet Transportation Needs (5/16/2022)    PRAPARE - Transportation     Lack of Transportation (Medical): Yes     Lack of Transportation (Non-Medical): Yes   Physical Activity: Inactive (5/16/2022)    Exercise Vital Sign     Days of Exercise per Week: 0 days     Minutes of Exercise per Session: 0 min   Stress: No Stress Concern Present (5/16/2022)    [place] of Occupational Health - Occupational Stress Questionnaire     Feeling of Stress : Not at all   Social Connections: Unknown (5/16/2022)    Social Connection and Isolation Panel [NHANES]     Frequency of Communication with Friends and Family: Three times a week     Frequency of Social Gatherings with Friends and Family: Never     Active Member of Clubs or Organizations: No     Attends Club or Organization Meetings: Never     Marital Status: Never    Housing Stability: Low Risk  (5/16/2022)    Housing Stability Vital Sign     Unable to Pay for Housing in the Last Year: No     Number of Places Lived in the Last Year: 1     Unstable Housing in the Last Year: No            OBJECTIVE:     Vital signs:   /71   Pulse (!) 120   Temp 98.5 °F (36.9 °C)   Resp 20   SpO2 96%      Physical Examination:  General:  Thin-appearing female, no acute distress.  Eye: PERRLA, EOMI, clear conjunctiva, eyelids normal  Head: normocephalic and atraumatic  Neck: full range of motion, supple  Respiratory: clear to auscultation bilaterally without wheezes, rales, rhonchi  Cardiovascular: regular rate and rhythm without murmurs.   Gastrointestinal: soft, non-tender, non-distended with normal bowel sounds in all four quadrants.   "J-tube in place.  Musculoskeletal:  Contracted lower extremities bilaterally..  Wheelchair-bound.  Integumentary: no rashes or skin lesions present, no erythema  Neurologic: AAO x 3, no dysarthria.  Psychiatric: cooperative with exam, good eye contact.         Current Outpatient Medications   Medication Instructions    albuterol (PROVENTIL/VENTOLIN HFA) 90 mcg/actuation inhaler USE 2 PUFFS BY MOUTH TWICE DAILY PRIOR TO HYPERSAL TX & EVERY 4HRS AS NEEDED COUGH/WHEEZING    anakinra (KINERET) 100 mg/0.67 mL injection Inject 0.67 mLs into the skin once daily in the morning.    azithromycin (ZITHROMAX) 500 MG tablet See Instructions, 1 tab(s) Oral Monday, Wednesday and Friday, # 13 tab(s), 5 Refill(s), Pharmacy: [facility], 152.4, cm, Height/Length Dosing, 12/07/21 13:16:00 CST, 32.4, kg, Weight Dosing, 03/10/22 11:00:00 CST    BD ULTRA-FINE SHORT PEN NEEDLE 31 gauge x 5/16" Jim CAMPBELL 300 mg/4 mL Nebu No dose, route, or frequency recorded.    blood sugar diagnostic Strp 1 strip, Misc.(Non-Drug; Combo Route), 3 times daily    blood-glucose meter kit Use as instructed    cholecalciferol (vitamin D3) (VITAMIN D3) 1,000 Units, Oral, Daily    CREON 24,000-76,000 -120,000 unit capsule 1 capsule, Oral, 3 times daily with meals    cyproheptadine (PERIACTIN) 4 mg, Oral, Daily    denosumab (PROLIA) 60 mg, Subcutaneous    dornase shea (PULMOZYME) 2.5 mg, Inhalation    ferrous sulfate (FEROSUL) 220 mg, Per J Tube, Every Mon, Wed, Fri    hydroxychloroquine (PLAQUENIL) 200 mg, Oral, Daily    insulin lispro 14 Units, Subcutaneous, 3 times daily with meals    lancets (ONETOUCH ULTRASOFT LANCETS) Misc 1 each, Misc.(Non-Drug; Combo Route), 3 times daily    lisinopriL (PRINIVIL,ZESTRIL) 5 mg, Oral, Daily    montelukast (SINGULAIR) 10 mg, Oral, Daily    naproxen (NAPROSYN) 375 mg, Oral, Daily PRN    sodium chloride 7% 7 % nebulizer solution Nebulization, As needed (PRN)    TOUJEO MAX U-300 SOLOSTAR 58 Units, " Subcutaneous, Daily    TRIKAFTA 100-50-75 mg(d) /150 mg (n) TbSQ No dose, route, or frequency recorded.         ASSESSMENT & PLAN:     Pancreatic insufficiency in setting of CF  Diabetes Mellitus related to above  -A1C typically well controlled in 5-7s.  -A1C on 02/08/2024 was 11.5. Repeat ordered.   -Continue Creon as it relates to pancreatic insuffiency  -Continue Toujeo, takes approximately 57-58 units, sliding scale.  -Continue lispro 14 U TIDWM  -patient checks glucose 3 times daily, states that it typically runs 100s-120s.  -Patient has appointment scheduled with Endocrinologist in [place].  -Micro/alb ratio elevated, started patient on lisinopril.  -order protein/creatinine ratio.    Cystic fibrosis  -Follows with [facility] cystic fibrosis Clinic, regular follow ups every 3 months.  -Continue azithromycin daily  -Continue Trikafta (Elexacaftor/tezacaftor/ivacaftor) for CF  -Continue Pulmozyme neb  -Continue Bethkis (Tobramycin)     RF+ Rheumatoid Arthritis  -Follows with Dr. Tony at [facility] in [place]  -Continue Anikinra  -Continue hydroxychloroquine      Osteoporosis  -2/2 chronic steroid use.   -Continue Prolia, prescribed by Rheum     Bilateral knee contractures  -Evaluated by Orthopedic surgery [place], deemed not to be good surgical candidate and regards to bilateral knee replacements  -Botox was suggested but patient declined at this time.  -Currently on baclofen per neurology.  -Working with PT.    Malnutrition  -J-tube in place, receives tube twice daily in addition to oral feeding.         Health Maintenance:  -Agreed to receive Tetanus vaccine today  Pap smear- had pap smear with OBGYN in 2018       Return to clinic in 3 months.       Bahman Mtz MD  [facility] PGY-1

## 2024-03-20 ENCOUNTER — LAB VISIT (OUTPATIENT)
Dept: LAB | Facility: HOSPITAL | Age: 24
End: 2024-03-20
Payer: MEDICAID

## 2024-03-20 ENCOUNTER — OFFICE VISIT (OUTPATIENT)
Dept: INTERNAL MEDICINE | Facility: CLINIC | Age: 24
End: 2024-03-20
Payer: MEDICAID

## 2024-03-20 VITALS
HEART RATE: 120 BPM | RESPIRATION RATE: 20 BRPM | DIASTOLIC BLOOD PRESSURE: 71 MMHG | TEMPERATURE: 99 F | SYSTOLIC BLOOD PRESSURE: 121 MMHG | OXYGEN SATURATION: 96 %

## 2024-03-20 DIAGNOSIS — Z00.00 ROUTINE ADULT HEALTH MAINTENANCE: Primary | ICD-10-CM

## 2024-03-20 DIAGNOSIS — Z79.4 DIABETES MELLITUS DUE TO UNDERLYING CONDITION WITHOUT COMPLICATION, WITH LONG-TERM CURRENT USE OF INSULIN: ICD-10-CM

## 2024-03-20 DIAGNOSIS — E08.9 DIABETES MELLITUS DUE TO UNDERLYING CONDITION WITHOUT COMPLICATION, WITH LONG-TERM CURRENT USE OF INSULIN: ICD-10-CM

## 2024-03-20 DIAGNOSIS — M24.562 BILATERAL KNEE CONTRACTURES: ICD-10-CM

## 2024-03-20 DIAGNOSIS — E84.9 CYSTIC FIBROSIS: ICD-10-CM

## 2024-03-20 DIAGNOSIS — K86.89 PANCREATIC INSUFFICIENCY: Primary | ICD-10-CM

## 2024-03-20 DIAGNOSIS — M06.9 RHEUMATOID ARTHRITIS, INVOLVING UNSPECIFIED SITE, UNSPECIFIED WHETHER RHEUMATOID FACTOR PRESENT: ICD-10-CM

## 2024-03-20 DIAGNOSIS — Z00.00 ROUTINE ADULT HEALTH MAINTENANCE: ICD-10-CM

## 2024-03-20 DIAGNOSIS — M24.561 BILATERAL KNEE CONTRACTURES: ICD-10-CM

## 2024-03-20 DIAGNOSIS — E84.0 CYSTIC FIBROSIS OF THE LUNG: ICD-10-CM

## 2024-03-20 LAB
ALBUMIN SERPL-MCNC: 2.2 G/DL (ref 3.5–5)
ALBUMIN/GLOB SERPL: 0.3 RATIO (ref 1.1–2)
ALP SERPL-CCNC: 129 UNIT/L (ref 40–150)
ALT SERPL-CCNC: 16 UNIT/L (ref 0–55)
AST SERPL-CCNC: 18 UNIT/L (ref 5–34)
BILIRUB SERPL-MCNC: 0.4 MG/DL
BUN SERPL-MCNC: 18.1 MG/DL (ref 7–18.7)
CALCIUM SERPL-MCNC: 9.7 MG/DL (ref 8.4–10.2)
CHLORIDE SERPL-SCNC: 101 MMOL/L (ref 98–107)
CHOLEST SERPL-MCNC: 167 MG/DL
CHOLEST/HDLC SERPL: 3 {RATIO} (ref 0–5)
CO2 SERPL-SCNC: 24 MMOL/L (ref 22–29)
CREAT SERPL-MCNC: 0.63 MG/DL (ref 0.55–1.02)
EST. AVERAGE GLUCOSE BLD GHB EST-MCNC: 303.4 MG/DL
GFR SERPLBLD CREATININE-BSD FMLA CKD-EPI: >60 MLS/MIN/1.73/M2
GLOBULIN SER-MCNC: 6.6 GM/DL (ref 2.4–3.5)
GLUCOSE SERPL-MCNC: 224 MG/DL (ref 74–100)
HBA1C MFR BLD: 12.2 %
HDLC SERPL-MCNC: 54 MG/DL (ref 35–60)
LDLC SERPL CALC-MCNC: 98 MG/DL (ref 50–140)
POTASSIUM SERPL-SCNC: 4.2 MMOL/L (ref 3.5–5.1)
PROT SERPL-MCNC: 8.8 GM/DL (ref 6.4–8.3)
SODIUM SERPL-SCNC: 133 MMOL/L (ref 136–145)
T4 FREE SERPL-MCNC: 1.06 NG/DL (ref 0.7–1.48)
TRIGL SERPL-MCNC: 76 MG/DL (ref 37–140)
TSH SERPL-ACNC: 1.36 UIU/ML (ref 0.35–4.94)
VLDLC SERPL CALC-MCNC: 15 MG/DL

## 2024-03-20 PROCEDURE — 99214 OFFICE O/P EST MOD 30 MIN: CPT | Mod: PBBFAC

## 2024-03-20 PROCEDURE — 80061 LIPID PANEL: CPT

## 2024-03-20 PROCEDURE — 80053 COMPREHEN METABOLIC PANEL: CPT

## 2024-03-20 PROCEDURE — 84443 ASSAY THYROID STIM HORMONE: CPT

## 2024-03-20 PROCEDURE — 36415 COLL VENOUS BLD VENIPUNCTURE: CPT

## 2024-03-20 PROCEDURE — 84439 ASSAY OF FREE THYROXINE: CPT

## 2024-03-20 PROCEDURE — 83036 HEMOGLOBIN GLYCOSYLATED A1C: CPT

## 2024-03-20 RX ORDER — INSULIN GLARGINE 300 U/ML
57 INJECTION, SOLUTION SUBCUTANEOUS DAILY
Qty: 1 PEN | Refills: 11 | Status: SHIPPED | OUTPATIENT
Start: 2024-03-20 | End: 2024-03-21

## 2024-03-20 RX ORDER — LISINOPRIL 5 MG/1
5 TABLET ORAL DAILY
Qty: 90 TABLET | Refills: 3 | Status: SHIPPED | OUTPATIENT
Start: 2024-03-20 | End: 2024-04-08

## 2024-03-20 RX ORDER — LANCETS
1 EACH MISCELLANEOUS 3 TIMES DAILY
Qty: 200 EACH | Refills: 6 | Status: SHIPPED | OUTPATIENT
Start: 2024-03-20

## 2024-03-20 RX ORDER — PEN NEEDLE, DIABETIC 31 GX5/16"
NEEDLE, DISPOSABLE MISCELLANEOUS
Qty: 100 EACH | Refills: 6 | Status: SHIPPED | OUTPATIENT
Start: 2024-03-20

## 2024-03-20 RX ORDER — INSULIN LISPRO 100 [IU]/ML
14 INJECTION, SOLUTION INTRAVENOUS; SUBCUTANEOUS
Qty: 3 EACH | Refills: 11 | Status: SHIPPED | OUTPATIENT
Start: 2024-03-20 | End: 2024-03-21

## 2024-03-21 ENCOUNTER — TELEPHONE (OUTPATIENT)
Dept: INTERNAL MEDICINE | Facility: CLINIC | Age: 24
End: 2024-03-21
Payer: MEDICAID

## 2024-03-21 DIAGNOSIS — E08.9 DIABETES MELLITUS DUE TO UNDERLYING CONDITION WITHOUT COMPLICATION, WITH LONG-TERM CURRENT USE OF INSULIN: ICD-10-CM

## 2024-03-21 DIAGNOSIS — Z79.4 DIABETES MELLITUS DUE TO UNDERLYING CONDITION WITHOUT COMPLICATION, WITH LONG-TERM CURRENT USE OF INSULIN: ICD-10-CM

## 2024-03-21 RX ORDER — INSULIN LISPRO 100 [IU]/ML
20 INJECTION, SOLUTION INTRAVENOUS; SUBCUTANEOUS
Qty: 3 EACH | Refills: 11 | Status: SHIPPED | OUTPATIENT
Start: 2024-03-21 | End: 2024-06-13 | Stop reason: SDUPTHER

## 2024-03-21 RX ORDER — INSULIN GLARGINE 300 U/ML
60 INJECTION, SOLUTION SUBCUTANEOUS DAILY
Qty: 1 PEN | Refills: 11 | Status: SHIPPED | OUTPATIENT
Start: 2024-03-21 | End: 2024-06-13 | Stop reason: SDUPTHER

## 2024-03-21 NOTE — TELEPHONE ENCOUNTER
Called regarding A1C results.  Patient has been taking Toujeo 58 units nightly and Lispro 16 units with meals.  Advised increase in Toujeo to 60 units nightly and Lispro 20 units with meals.  Patient will log glucose readings over the next 2 weeks and bring log into clinic.  Will titrate insulin as appropriate at that time.

## 2024-04-05 ENCOUNTER — TELEPHONE (OUTPATIENT)
Dept: INTERNAL MEDICINE | Facility: CLINIC | Age: 24
End: 2024-04-05
Payer: MEDICAID

## 2024-04-05 NOTE — TELEPHONE ENCOUNTER
Pt's mother is calling  to speak to provider's nurse and as well asking for a order for one touch Verio touch strip ,because of strips do not fit PLEASE ASSIST

## 2024-04-08 ENCOUNTER — TELEPHONE (OUTPATIENT)
Dept: INTERNAL MEDICINE | Facility: CLINIC | Age: 24
End: 2024-04-08
Payer: MEDICAID

## 2024-04-08 DIAGNOSIS — Z79.4 DIABETES MELLITUS DUE TO UNDERLYING CONDITION WITHOUT COMPLICATION, WITH LONG-TERM CURRENT USE OF INSULIN: ICD-10-CM

## 2024-04-08 DIAGNOSIS — E08.9 DIABETES MELLITUS DUE TO UNDERLYING CONDITION WITHOUT COMPLICATION, WITH LONG-TERM CURRENT USE OF INSULIN: ICD-10-CM

## 2024-04-08 RX ORDER — LISINOPRIL 5 MG/1
2.5 TABLET ORAL DAILY
Qty: 45 TABLET | Refills: 3 | Status: SHIPPED | OUTPATIENT
Start: 2024-04-08 | End: 2025-04-08

## 2024-04-08 NOTE — TELEPHONE ENCOUNTER
----- Message from Angy Canas sent at 4/8/2024  8:14 AM CDT -----  Regarding: Dr. Mtz  Patient mother called requesting the Derio blood suga trip please be sent in to the pharmacy

## 2024-04-08 NOTE — TELEPHONE ENCOUNTER
The Rx sent for test strips were not the correct strips. Mother is requesting the Derio Flex strips for the glucometer that she has. Mother also states that the Pulmonologist is requesting for you to lower her Lisinopril to  2.5mg. The 5mg is too high. Please review and advise?

## 2024-04-09 NOTE — TELEPHONE ENCOUNTER
I called and spoke to the daughter and gave her the message that Rxs was sent to pharmacy for test strips and Lisinopril 2.5mg. Call ended.

## 2024-04-11 ENCOUNTER — TELEPHONE (OUTPATIENT)
Dept: INTERNAL MEDICINE | Facility: CLINIC | Age: 24
End: 2024-04-11
Payer: MEDICAID

## 2024-04-12 RX ORDER — NEBULIZER AND COMPRESSOR
1 EACH MISCELLANEOUS DAILY
Refills: 0 | COMMUNITY
Start: 2024-04-12

## 2024-04-16 RX ORDER — SODIUM CHLORIDE 0.9 % (FLUSH) 0.9 %
10 SYRINGE (ML) INJECTION
Status: CANCELLED | OUTPATIENT
Start: 2024-04-24

## 2024-04-16 RX ORDER — HEPARIN 100 UNIT/ML
500 SYRINGE INTRAVENOUS
Status: CANCELLED | OUTPATIENT
Start: 2024-04-24

## 2024-04-16 NOTE — ADDENDUM NOTE
Addended by: CHAPO MARTINEZ on: 4/16/2024 12:07 PM     Modules accepted: Orders, Level of Service

## 2024-04-25 ENCOUNTER — INFUSION (OUTPATIENT)
Dept: INFUSION THERAPY | Facility: HOSPITAL | Age: 24
End: 2024-04-25
Attending: INTERNAL MEDICINE
Payer: MEDICAID

## 2024-04-25 VITALS
TEMPERATURE: 98 F | RESPIRATION RATE: 18 BRPM | HEART RATE: 136 BPM | DIASTOLIC BLOOD PRESSURE: 83 MMHG | SYSTOLIC BLOOD PRESSURE: 112 MMHG | OXYGEN SATURATION: 95 %

## 2024-04-25 DIAGNOSIS — E84.0 CYSTIC FIBROSIS OF THE LUNG: Primary | ICD-10-CM

## 2024-04-25 PROCEDURE — A4216 STERILE WATER/SALINE, 10 ML: HCPCS

## 2024-04-25 PROCEDURE — 25000003 PHARM REV CODE 250

## 2024-04-25 PROCEDURE — 63600175 PHARM REV CODE 636 W HCPCS

## 2024-04-25 PROCEDURE — 96523 IRRIG DRUG DELIVERY DEVICE: CPT

## 2024-04-25 RX ORDER — SODIUM CHLORIDE 0.9 % (FLUSH) 0.9 %
10 SYRINGE (ML) INJECTION
Status: DISCONTINUED | OUTPATIENT
Start: 2024-04-25 | End: 2024-04-25 | Stop reason: HOSPADM

## 2024-04-25 RX ORDER — SODIUM CHLORIDE 0.9 % (FLUSH) 0.9 %
10 SYRINGE (ML) INJECTION
OUTPATIENT
Start: 2024-04-25

## 2024-04-25 RX ORDER — HEPARIN 100 UNIT/ML
500 SYRINGE INTRAVENOUS
Status: DISCONTINUED | OUTPATIENT
Start: 2024-04-25 | End: 2024-04-25 | Stop reason: HOSPADM

## 2024-04-25 RX ORDER — HEPARIN 100 UNIT/ML
500 SYRINGE INTRAVENOUS
OUTPATIENT
Start: 2024-04-25

## 2024-04-25 RX ADMIN — Medication 10 ML: at 07:04

## 2024-04-25 RX ADMIN — HEPARIN 500 UNITS: 100 SYRINGE at 07:04

## 2024-04-25 NOTE — NURSING
0713 Patient is here for Mediport flush. Arrived via wheelchair accompanied by her mother, Diane. Patient states her mediport  hasn't been flushed in a long time and sometimes a blood return is obtain and sometimes not.   0721 mediport flushes easily; no blood return today.

## 2024-05-02 ENCOUNTER — TELEPHONE (OUTPATIENT)
Dept: INTERNAL MEDICINE | Facility: CLINIC | Age: 24
End: 2024-05-02
Payer: MEDICAID

## 2024-05-02 NOTE — TELEPHONE ENCOUNTER
Mom called to verify that is safe to give lisinopril with her insulin . Verified that it is safe to take . Mother states she is just starting this med due to dosage concern she has since checked with MD in [place] and states will start today. Pt verbalized understanding, no other questions at this time , reminded of appt on 6/12/24

## 2024-05-02 NOTE — TELEPHONE ENCOUNTER
Pt mother called requesting a call back to discuss Lisinopril Rx he prescribed for her at last office visit. Pt mother states she has questions and concerns about it and requesting a call back.

## 2024-05-10 ENCOUNTER — TELEPHONE (OUTPATIENT)
Dept: INTERNAL MEDICINE | Facility: CLINIC | Age: 24
End: 2024-05-10
Payer: MEDICAID

## 2024-05-10 NOTE — TELEPHONE ENCOUNTER
PT mother reports some dizziness after taking lisnopril  she states it last about an hour , no nausea or vomiting , swelling noted . She states she has been giving at 6 pm , advised to back up closer to bedtime until she can tolerate better. Mother states she just started the medicine last week . Encouraged to drink adequate fluids through out the day . She verbalizes understanding

## 2024-05-10 NOTE — TELEPHONE ENCOUNTER
----- Message from Angy Canas sent at 5/10/2024  8:35 AM CDT -----  Regarding: Dr. Mtz / Ms Alana  Patient's mother is having questions and concerns about prescription lisinopriL (PRINIVIL,ZESTRIL) 5 MG t pls reach out to her    Diane Luna   # [phone]

## 2024-06-12 NOTE — PROGRESS NOTES
[facility] INTERNAL MEDICINE  OUTPATIENT OFFICE VISIT NOTE    SUBJECTIVE:   Chief Complaint: Routine follow up    HPI: Ms. Luna is a 22 yof with PMHx significant for cystic fibrosis, RF + rheumatoid arthritis, cystic fibrosis related pancreatic insufficiency and diabetes mellitus, protein/calorie malnutrition s/p J-tube placement who presents to [facility] IM clinic with her mother for routine followup.  She was last seen on 03/20/2024. She follows with Rheumatology, Dr. Tony at [facility] in [place].  She follows with [facility] clinic for cystic fibrosis every 3 months.     Patient states that she has been feeling well and is compliant with all of her medications.  Last visit patient was started on lisinopril 2.5 mg given elevated microalbumin creatinine ratio.  Patient states that she felt slightly dizzy the 1st couple of days of taking it but has since been doing well with it.  Patient tachycardic clinic today with pulse at 140.  Denies any dizziness, chest pain, palpitations.  States that she drinks about 2 bottles of water per day and thinks that she might be dehydrated.    Patient very consistent with insulin regimen and logging her sugars, fasting glucose appears to be well-controlled.  Daytime glucose elevated.  A1c was previously well-controlled on most recent readings 12.2 -> 11.2.  She has an appointment with endocrinologist in [place] later this month.         Constitutional: no fever/chills  EENT: no sore throat, ear pain, sinus pain/congestion, nasal congestion/drainage  Respiratory: no cough, no wheezing, no shortness of breath  Cardiovascular: no chest pain, no palpitations, no edema  Gastrointestinal: no nausea, vomiting, or diarrhea. No abdominal pain  Genitourinary: no dysuria, no urinary frequency or urgency, no hematuria  Integumentary: no skin rash or abnormal lesion  Neurologic: no headache, no dizziness, no weakness or numbness        Past Medical History:   Diagnosis Date    Cystic  fibrosis     G tube feedings     Rheumatoid arthritis        Past Surgical History:   Procedure Laterality Date    MEDIPORT INSERTION, SINGLE         Social History     Socioeconomic History    Marital status: Single   Tobacco Use    Smoking status: Never    Smokeless tobacco: Never   Substance and Sexual Activity    Alcohol use: Never    Drug use: Never    Sexual activity: Not Currently     Social Determinants of Health     Financial Resource Strain: Low Risk  (5/16/2022)    Overall Financial Resource Strain (CARDIA)     Difficulty of Paying Living Expenses: Not very hard   Food Insecurity: No Food Insecurity (5/16/2022)    Hunger Vital Sign     Worried About Running Out of Food in the Last Year: Never true     Ran Out of Food in the Last Year: Never true   Transportation Needs: Unmet Transportation Needs (5/16/2022)    PRAPARE - Transportation     Lack of Transportation (Medical): Yes     Lack of Transportation (Non-Medical): Yes   Physical Activity: Inactive (5/16/2022)    Exercise Vital Sign     Days of Exercise per Week: 0 days     Minutes of Exercise per Session: 0 min   Stress: No Stress Concern Present (5/16/2022)    [employer] [place] [employer] - Occupational Stress Questionnaire     Feeling of Stress : Not at all   Housing Stability: Low Risk  (5/16/2022)    Housing Stability Vital Sign     Unable to Pay for Housing in the Last Year: No     Number of Places Lived in the Last Year: 1     Unstable Housing in the Last Year: No            OBJECTIVE:     Vital signs:   /65 (BP Location: Left arm, Patient Position: Sitting, BP Method: Large (Automatic))   Pulse (!) 140   Temp 99 °F (37.2 °C)   Resp 20   SpO2 97%      Physical Examination:  General:  Thin-appearing female, no acute distress.  Eye: clear conjunctiva, eyelids normal  Head: normocephalic and atraumatic  Neck: full range of motion, supple  Respiratory: clear to auscultation bilaterally without wheezes, rales,  "rhonchi  Cardiovascular: regular rate and rhythm without murmurs.   Gastrointestinal: soft, non-tender, non-distended with normal bowel sounds in all four quadrants.  J-tube in place.  Musculoskeletal:  Contracted lower extremities bilaterally.  Wheelchair-bound.  Integumentary: no rashes or skin lesions present, no erythema  Neurologic: AAO x 3, no dysarthria.  Psychiatric: cooperative with exam, good eye contact.         Current Outpatient Medications   Medication Instructions    albuterol (PROVENTIL/VENTOLIN HFA) 90 mcg/actuation inhaler USE 2 PUFFS BY MOUTH TWICE DAILY PRIOR TO HYPERSAL TX & EVERY 4HRS AS NEEDED COUGH/WHEEZING    anakinra (KINERET) 100 mg/0.67 mL injection Inject 0.67 mLs into the skin in the morning. Inject 0.67 mLs (100 mg total) into the skin once daily.    azithromycin (ZITHROMAX) 500 MG tablet See Instructions, 1 tab(s) Oral Monday, Wednesday and Friday, # 13 tab(s), 5 Refill(s), Pharmacy: [facility], 152.4, cm, Height/Length Dosing, 12/07/21 13:16:00 CST, 32.4, kg, Weight Dosing, 03/10/22 11:00:00 CST    BD ULTRA-FINE SHORT PEN NEEDLE 31 gauge x 5/16" Jim CAMPBELL 300 mg/4 mL Nebu No dose, route, or frequency recorded.    BLOOD PRESSURE CUFF Misc 1 each, Misc.(Non-Drug; Combo Route), Daily    blood sugar diagnostic Strp 1 each, Misc.(Non-Drug; Combo Route), 3 times daily    blood-glucose meter kit Use as instructed    cholecalciferol (vitamin D3) (VITAMIN D3) 1,000 Units, Oral, Daily    CREON 24,000-76,000 -120,000 unit capsule 1 capsule, Oral, 3 times daily with meals    cyproheptadine (PERIACTIN) 4 mg, Oral, Daily    denosumab (PROLIA) 60 mg, Subcutaneous    dornase shea (PULMOZYME) 2.5 mg, Inhalation    ferrous sulfate (FEROSUL) 220 mg, Per J Tube, Every Mon, Wed, Fri    hydroxychloroquine (PLAQUENIL) 200 mg, Oral, Daily    insulin lispro 22 Units, Subcutaneous, 3 times daily with meals    lancets (ONETOUCH ULTRASOFT LANCETS) Misc 1 each, Misc.(Non-Drug; Combo " Route), 3 times daily    lisinopriL (PRINIVIL,ZESTRIL) 2.5 mg, Oral, Daily    montelukast (SINGULAIR) 10 mg, Oral, Daily    naproxen (NAPROSYN) 375 mg, Oral, Daily PRN    sodium chloride 7% 7 % nebulizer solution Nebulization, As needed (PRN)    TOUJEO MAX U-300 SOLOSTAR 60 Units, Subcutaneous, Daily    TRIKAFTA 100-50-75 mg(d) /150 mg (n) TbSQ No dose, route, or frequency recorded.         ASSESSMENT & PLAN:     Tachycardia  -pulse in clinic today 140.    -based on prior vitals pulse appears to be consistently elevated typically in 110s.  -patient denies any dizziness, chest pain, palpitations, shortness for breath.    -EKG performed in clinic revealed sinus tachycardia with evidence of biatrial enlargement.    -echocardiogram ordered.  -possible this is related to dehydration, encouraged patient to increase water intake.  -Last TFTs WNL, will repeat.  -ED precautions provided.    Pancreatic insufficiency in setting of CF  Diabetes Mellitus related to above  -A1C previously well controlled in 5-7s.  -recent A1c trend: 11.5 -> 12.2 -> 11.2 today.  -Continue Creon as it relates to pancreatic insuffiency  -fasting glucose typically in the 100s.    -daytime glucose readings consistently in the 200s.  -Continue Toujeo, takes approximately 57-58 units, sliding scale.  -Patient was on lispro 20 U TIDWM,  increase to 22 U.   -Patient has appointment scheduled with Endocrinologist in [place] this month.  -Micro/alb ratio elevated, continue lisinopril.    Cystic fibrosis  -Follows with [facility] cystic fibrosis [facility], regular follow ups every 3 months.  -Continue azithromycin daily  -Continue Trikafta (Elexacaftor/tezacaftor/ivacaftor) for CF  -Continue Pulmozyme neb  -Continue Bethkis (Tobramycin)     RF+ Rheumatoid Arthritis  -Follows with Dr. Tony at [facility] in [place]  -Continue Anikinra  -Continue hydroxychloroquine      Osteoporosis  -2/2 chronic steroid use.   -Continue Prolia, prescribed by  Rheum     Bilateral knee contractures  -Evaluated by Orthopedic surgery [place], deemed not to be good surgical candidate and regards to bilateral knee replacements  -Botox was suggested but patient declined at this time.  -Currently on baclofen per neurology.    Malnutrition  -J-tube in place, receives tube twice daily in addition to oral feeding.       Health Maintenance   Pneumococcal vaccine: 12/27/2023  TDAP: 7/10/2023  Influenza vaccine:  12/27/2023  Shingrix vaccine: n/a  Cervical Cancer: pap w/ OBGYN in 2018  MMG: n/a  Colon CA screening:  n/a  Lung Cancer Screening: n/a  Hepatitis C: 04/12/2022  HIV:   COVID-19: declined         Return to clinic in 3 months.       Bahman Mtz MD  [employer] Internal Medicine PGY-1

## 2024-06-13 ENCOUNTER — LAB VISIT (OUTPATIENT)
Dept: LAB | Facility: HOSPITAL | Age: 24
End: 2024-06-13
Payer: MEDICAID

## 2024-06-13 ENCOUNTER — OFFICE VISIT (OUTPATIENT)
Dept: INTERNAL MEDICINE | Facility: CLINIC | Age: 24
End: 2024-06-13
Payer: MEDICAID

## 2024-06-13 VITALS
SYSTOLIC BLOOD PRESSURE: 118 MMHG | RESPIRATION RATE: 20 BRPM | HEART RATE: 140 BPM | TEMPERATURE: 99 F | OXYGEN SATURATION: 97 % | DIASTOLIC BLOOD PRESSURE: 65 MMHG

## 2024-06-13 DIAGNOSIS — E84.0 CYSTIC FIBROSIS OF THE LUNG: ICD-10-CM

## 2024-06-13 DIAGNOSIS — E08.9 DIABETES MELLITUS DUE TO UNDERLYING CONDITION WITHOUT COMPLICATION, WITH LONG-TERM CURRENT USE OF INSULIN: ICD-10-CM

## 2024-06-13 DIAGNOSIS — Z00.00 ROUTINE ADULT HEALTH MAINTENANCE: ICD-10-CM

## 2024-06-13 DIAGNOSIS — Z79.4 DIABETES MELLITUS DUE TO UNDERLYING CONDITION WITHOUT COMPLICATION, WITH LONG-TERM CURRENT USE OF INSULIN: ICD-10-CM

## 2024-06-13 DIAGNOSIS — E84.9 CYSTIC FIBROSIS: ICD-10-CM

## 2024-06-13 DIAGNOSIS — M06.9 RHEUMATOID ARTHRITIS, INVOLVING UNSPECIFIED SITE, UNSPECIFIED WHETHER RHEUMATOID FACTOR PRESENT: ICD-10-CM

## 2024-06-13 DIAGNOSIS — R00.0 TACHYCARDIA: Primary | ICD-10-CM

## 2024-06-13 DIAGNOSIS — K86.89 PANCREATIC INSUFFICIENCY: ICD-10-CM

## 2024-06-13 LAB
CREAT UR-MCNC: 69.8 MG/DL (ref 45–106)
HBA1C MFR BLD: 11.2 %
OHS QRS DURATION: 74 MS
OHS QTC CALCULATION: 422 MS
PROT UR STRIP-MCNC: 335 MG/DL
URINE PROTEIN/CREATININE RATIO (OLG): 4.8

## 2024-06-13 PROCEDURE — 84156 ASSAY OF PROTEIN URINE: CPT

## 2024-06-13 PROCEDURE — 99214 OFFICE O/P EST MOD 30 MIN: CPT | Mod: PBBFAC,25

## 2024-06-13 PROCEDURE — 93005 ELECTROCARDIOGRAM TRACING: CPT

## 2024-06-13 PROCEDURE — 83036 HEMOGLOBIN GLYCOSYLATED A1C: CPT | Mod: PBBFAC

## 2024-06-13 RX ORDER — AZITHROMYCIN 500 MG/1
TABLET, FILM COATED ORAL
Qty: 30 TABLET | Refills: 3 | Status: SHIPPED | OUTPATIENT
Start: 2024-06-13

## 2024-06-13 RX ORDER — PANCRELIPASE 24000; 76000; 120000 [USP'U]/1; [USP'U]/1; [USP'U]/1
1 CAPSULE, DELAYED RELEASE PELLETS ORAL
Qty: 120 CAPSULE | Refills: 11 | Status: SHIPPED | OUTPATIENT
Start: 2024-06-13

## 2024-06-13 RX ORDER — INSULIN LISPRO 100 [IU]/ML
22 INJECTION, SOLUTION INTRAVENOUS; SUBCUTANEOUS
Qty: 3 EACH | Refills: 11 | Status: SHIPPED | OUTPATIENT
Start: 2024-06-13

## 2024-06-13 RX ORDER — INSULIN GLARGINE 300 U/ML
60 INJECTION, SOLUTION SUBCUTANEOUS DAILY
Qty: 1 PEN | Refills: 11 | Status: SHIPPED | OUTPATIENT
Start: 2024-06-13

## 2024-06-13 NOTE — PROGRESS NOTES
Attending Physician Addendum  Pt discussed with medicine resident in clinic  Adjusting Insulin  Care provided is appropriate  Agree with above ASSESSMENT AND PLAN

## 2024-08-06 ENCOUNTER — HOSPITAL ENCOUNTER (OUTPATIENT)
Dept: CARDIOLOGY | Facility: HOSPITAL | Age: 24
Discharge: HOME | End: 2024-08-06
Payer: MEDICAID

## 2024-08-06 ENCOUNTER — INFUSION (OUTPATIENT)
Dept: INFUSION THERAPY | Facility: HOSPITAL | Age: 24
End: 2024-08-06
Attending: INTERNAL MEDICINE
Payer: MEDICAID

## 2024-08-06 VITALS
HEART RATE: 119 BPM | RESPIRATION RATE: 18 BRPM | BODY MASS INDEX: 14.93 KG/M2 | DIASTOLIC BLOOD PRESSURE: 65 MMHG | OXYGEN SATURATION: 95 % | SYSTOLIC BLOOD PRESSURE: 119 MMHG | TEMPERATURE: 98 F | WEIGHT: 79 LBS

## 2024-08-06 VITALS — HEIGHT: 61 IN | BODY MASS INDEX: 14.91 KG/M2 | WEIGHT: 79 LBS

## 2024-08-06 DIAGNOSIS — R00.0 TACHYCARDIA: ICD-10-CM

## 2024-08-06 DIAGNOSIS — E84.0 CYSTIC FIBROSIS OF THE LUNG: Primary | ICD-10-CM

## 2024-08-06 LAB
APICAL FOUR CHAMBER EJECTION FRACTION: 60 %
APICAL TWO CHAMBER EJECTION FRACTION: 62 %
AV INDEX (PROSTH): 0.76
AV MEAN GRADIENT: 3 MMHG
AV PEAK GRADIENT: 5 MMHG
AV VALVE AREA BY VELOCITY RATIO: 2.42 CM²
AV VALVE AREA: 2.27 CM²
AV VELOCITY RATIO: 0.81
BSA FOR ECHO PROCEDURE: 1.24 M2
CV ECHO LV RWT: 0.48 CM
DOP CALC AO PEAK VEL: 1.1 M/S
DOP CALC AO VTI: 17.6 CM
DOP CALC LVOT AREA: 3 CM2
DOP CALC LVOT DIAMETER: 1.95 CM
DOP CALC LVOT PEAK VEL: 0.89 M/S
DOP CALC LVOT STROKE VOLUME: 40 CM3
DOP CALC MV VTI: 17.9 CM
DOP CALCLVOT PEAK VEL VTI: 13.4 CM
E WAVE DECELERATION TIME: 164.52 MSEC
E/A RATIO: 1
ECHO LV POSTERIOR WALL: 0.73 CM (ref 0.6–1.1)
FRACTIONAL SHORTENING: 37 % (ref 28–44)
HR MV ECHO: 97 BPM
INTERVENTRICULAR SEPTUM: 0.64 CM (ref 0.6–1.1)
IVC DIAMETER: 1 CM
LEFT ATRIUM AREA SYSTOLIC (APICAL 2 CHAMBER): 3.73 CM2
LEFT ATRIUM AREA SYSTOLIC (APICAL 4 CHAMBER): 4.53 CM2
LEFT ATRIUM SIZE: 1.96 CM
LEFT ATRIUM VOLUME INDEX MOD: 4.3 ML/M2
LEFT ATRIUM VOLUME MOD: 5.48 CM3
LEFT INTERNAL DIMENSION IN SYSTOLE: 1.93 CM (ref 2.1–4)
LEFT VENTRICLE DIASTOLIC VOLUME INDEX: 28.77 ML/M2
LEFT VENTRICLE DIASTOLIC VOLUME: 36.54 ML
LEFT VENTRICLE END DIASTOLIC VOLUME APICAL 2 CHAMBER: 28.99 ML
LEFT VENTRICLE END DIASTOLIC VOLUME APICAL 4 CHAMBER: 30.4 ML
LEFT VENTRICLE END SYSTOLIC VOLUME APICAL 2 CHAMBER: 4.58 ML
LEFT VENTRICLE END SYSTOLIC VOLUME APICAL 4 CHAMBER: 3.96 ML
LEFT VENTRICLE MASS INDEX: 38 G/M2
LEFT VENTRICLE SYSTOLIC VOLUME INDEX: 9.2 ML/M2
LEFT VENTRICLE SYSTOLIC VOLUME: 11.66 ML
LEFT VENTRICULAR INTERNAL DIMENSION IN DIASTOLE: 3.05 CM (ref 3.5–6)
LEFT VENTRICULAR MASS: 48.84 G
LV LATERAL E/E' RATIO: 9.13 M/S
LVED V (TEICH): 36.54 ML
LVES V (TEICH): 11.66 ML
LVOT MG: 1.5 MMHG
LVOT MV: 0.55 CM/S
MV MEAN GRADIENT: 2 MMHG
MV PEAK A VEL: 0.73 M/S
MV PEAK E VEL: 0.73 M/S
MV PEAK GRADIENT: 4 MMHG
MV STENOSIS PRESSURE HALF TIME: 47.71 MS
MV VALVE AREA BY CONTINUITY EQUATION: 2.23 CM2
MV VALVE AREA P 1/2 METHOD: 4.61 CM2
OHS LV EJECTION FRACTION SIMPSONS BIPLANE MOD: 61 %
RA MAJOR: 2.69 CM
RA PRESSURE ESTIMATED: 3 MMHG
TDI LATERAL: 0.08 M/S
TRICUSPID ANNULAR PLANE SYSTOLIC EXCURSION: 0.88 CM
Z-SCORE OF LEFT VENTRICULAR DIMENSION IN END DIASTOLE: -3.34
Z-SCORE OF LEFT VENTRICULAR DIMENSION IN END SYSTOLE: -2.49

## 2024-08-06 PROCEDURE — 63600175 PHARM REV CODE 636 W HCPCS

## 2024-08-06 PROCEDURE — 25000003 PHARM REV CODE 250

## 2024-08-06 PROCEDURE — 96523 IRRIG DRUG DELIVERY DEVICE: CPT

## 2024-08-06 PROCEDURE — 93306 TTE W/DOPPLER COMPLETE: CPT

## 2024-08-06 PROCEDURE — A4216 STERILE WATER/SALINE, 10 ML: HCPCS

## 2024-08-06 RX ORDER — HEPARIN 100 UNIT/ML
500 SYRINGE INTRAVENOUS
Status: DISCONTINUED | OUTPATIENT
Start: 2024-08-06 | End: 2024-08-06 | Stop reason: HOSPADM

## 2024-08-06 RX ORDER — SODIUM CHLORIDE 0.9 % (FLUSH) 0.9 %
10 SYRINGE (ML) INJECTION
OUTPATIENT
Start: 2024-08-06

## 2024-08-06 RX ORDER — HEPARIN 100 UNIT/ML
500 SYRINGE INTRAVENOUS
OUTPATIENT
Start: 2024-08-06

## 2024-08-06 RX ORDER — HEPARIN 100 UNIT/ML
SYRINGE INTRAVENOUS
Status: COMPLETED
Start: 2024-08-06 | End: 2024-08-06

## 2024-08-06 RX ORDER — SODIUM CHLORIDE 0.9 % (FLUSH) 0.9 %
10 SYRINGE (ML) INJECTION
Status: DISCONTINUED | OUTPATIENT
Start: 2024-08-06 | End: 2024-08-06 | Stop reason: HOSPADM

## 2024-08-06 RX ADMIN — HEPARIN 500 UNITS: 100 SYRINGE at 08:08

## 2024-08-06 RX ADMIN — SODIUM CHLORIDE, PRESERVATIVE FREE 10 ML: 5 INJECTION INTRAVENOUS at 08:08

## 2024-08-07 ENCOUNTER — TELEPHONE (OUTPATIENT)
Dept: INTERNAL MEDICINE | Facility: CLINIC | Age: 24
End: 2024-08-07
Payer: MEDICAID

## 2024-08-07 NOTE — TELEPHONE ENCOUNTER
Attempted to call patient x 1 but no answer so left voicemail regarding call about Lisinopril, will attempt at a later time.

## 2024-08-07 NOTE — TELEPHONE ENCOUNTER
Pt mother called requesting a call back concerning pt medication Lisinopril. Mother has concerns.Please advise

## 2024-09-09 ENCOUNTER — TELEPHONE (OUTPATIENT)
Dept: INTERNAL MEDICINE | Facility: CLINIC | Age: 24
End: 2024-09-09
Payer: MEDICAID

## 2024-09-09 NOTE — TELEPHONE ENCOUNTER
Pt mother called requesting a call back concerning pt test strips. Requesting a call back at [phone]

## 2024-09-09 NOTE — TELEPHONE ENCOUNTER
PA for Trumetrix test strips needs to be done for insurance to cover quantity of 200 due to patient checking blood sugar TID.

## 2024-09-09 NOTE — TELEPHONE ENCOUNTER
PA done via phone with [facility] and informed PA was denied due to insurance coverage and allowable amount at this time is 100 test strips per 30 days. Contacted patient ID and  verified Diane Luna, patient's mother. Patient's mother informed of PA denial. Diane Luna patient's  mother verbalized complete understanding,

## 2024-09-23 DIAGNOSIS — Z79.4 DIABETES MELLITUS DUE TO UNDERLYING CONDITION WITHOUT COMPLICATION, WITH LONG-TERM CURRENT USE OF INSULIN: ICD-10-CM

## 2024-09-23 DIAGNOSIS — E08.9 DIABETES MELLITUS DUE TO UNDERLYING CONDITION WITHOUT COMPLICATION, WITH LONG-TERM CURRENT USE OF INSULIN: ICD-10-CM

## 2024-10-30 ENCOUNTER — INFUSION (OUTPATIENT)
Dept: INFUSION THERAPY | Facility: HOSPITAL | Age: 24
End: 2024-10-30
Attending: INTERNAL MEDICINE
Payer: MEDICAID

## 2024-10-30 ENCOUNTER — OFFICE VISIT (OUTPATIENT)
Dept: INTERNAL MEDICINE | Facility: CLINIC | Age: 24
End: 2024-10-30
Payer: MEDICAID

## 2024-10-30 VITALS
BODY MASS INDEX: 14.91 KG/M2 | SYSTOLIC BLOOD PRESSURE: 93 MMHG | TEMPERATURE: 99 F | WEIGHT: 79 LBS | DIASTOLIC BLOOD PRESSURE: 72 MMHG | OXYGEN SATURATION: 95 % | RESPIRATION RATE: 127 BRPM | HEIGHT: 61 IN

## 2024-10-30 DIAGNOSIS — K86.89 PANCREATIC INSUFFICIENCY: Primary | ICD-10-CM

## 2024-10-30 DIAGNOSIS — E84.0 CYSTIC FIBROSIS OF THE LUNG: Primary | ICD-10-CM

## 2024-10-30 DIAGNOSIS — Z79.4 DIABETES MELLITUS DUE TO UNDERLYING CONDITION WITHOUT COMPLICATION, WITH LONG-TERM CURRENT USE OF INSULIN: ICD-10-CM

## 2024-10-30 DIAGNOSIS — E84.0 CYSTIC FIBROSIS OF THE LUNG: ICD-10-CM

## 2024-10-30 DIAGNOSIS — E08.9 DIABETES MELLITUS DUE TO UNDERLYING CONDITION WITHOUT COMPLICATION, WITH LONG-TERM CURRENT USE OF INSULIN: ICD-10-CM

## 2024-10-30 DIAGNOSIS — Z00.00 ROUTINE ADULT HEALTH MAINTENANCE: ICD-10-CM

## 2024-10-30 DIAGNOSIS — E55.9 VITAMIN D DEFICIENCY: ICD-10-CM

## 2024-10-30 PROCEDURE — 99213 OFFICE O/P EST LOW 20 MIN: CPT | Mod: PBBFAC,25

## 2024-10-30 PROCEDURE — 90471 IMMUNIZATION ADMIN: CPT | Mod: PBBFAC

## 2024-10-30 PROCEDURE — 96523 IRRIG DRUG DELIVERY DEVICE: CPT

## 2024-10-30 PROCEDURE — 63600175 PHARM REV CODE 636 W HCPCS

## 2024-10-30 PROCEDURE — A4216 STERILE WATER/SALINE, 10 ML: HCPCS

## 2024-10-30 PROCEDURE — 90656 IIV3 VACC NO PRSV 0.5 ML IM: CPT | Mod: PBBFAC

## 2024-10-30 PROCEDURE — 25000003 PHARM REV CODE 250

## 2024-10-30 RX ORDER — SODIUM CHLORIDE 0.9 % (FLUSH) 0.9 %
10 SYRINGE (ML) INJECTION
OUTPATIENT
Start: 2024-10-30

## 2024-10-30 RX ORDER — HEPARIN 100 UNIT/ML
500 SYRINGE INTRAVENOUS
OUTPATIENT
Start: 2024-10-30

## 2024-10-30 RX ORDER — SODIUM CHLORIDE 0.9 % (FLUSH) 0.9 %
10 SYRINGE (ML) INJECTION
Status: DISCONTINUED | OUTPATIENT
Start: 2024-10-30 | End: 2024-10-30 | Stop reason: HOSPADM

## 2024-10-30 RX ORDER — AZITHROMYCIN 500 MG/1
TABLET, FILM COATED ORAL
Qty: 30 TABLET | Refills: 3 | Status: SHIPPED | OUTPATIENT
Start: 2024-10-30

## 2024-10-30 RX ORDER — INSULIN GLARGINE 300 U/ML
60 INJECTION, SOLUTION SUBCUTANEOUS DAILY
Qty: 1 PEN | Refills: 11 | Status: SHIPPED | OUTPATIENT
Start: 2024-10-30

## 2024-10-30 RX ORDER — ALBUTEROL SULFATE 90 UG/1
INHALANT RESPIRATORY (INHALATION)
Qty: 18 G | Refills: 6 | Status: SHIPPED | OUTPATIENT
Start: 2024-10-30

## 2024-10-30 RX ORDER — INSULIN LISPRO 100 [IU]/ML
25 INJECTION, SOLUTION INTRAVENOUS; SUBCUTANEOUS
Qty: 3 EACH | Refills: 11 | Status: SHIPPED | OUTPATIENT
Start: 2024-10-30

## 2024-10-30 RX ORDER — VIT C/E/ZN/COPPR/LUTEIN/ZEAXAN 250MG-90MG
1000 CAPSULE ORAL DAILY
Qty: 90 CAPSULE | Refills: 6 | Status: SHIPPED | OUTPATIENT
Start: 2024-10-30

## 2024-10-30 RX ORDER — HEPARIN 100 UNIT/ML
500 SYRINGE INTRAVENOUS
Status: DISCONTINUED | OUTPATIENT
Start: 2024-10-30 | End: 2024-10-30 | Stop reason: HOSPADM

## 2024-10-30 RX ORDER — PANCRELIPASE 24000; 76000; 120000 [USP'U]/1; [USP'U]/1; [USP'U]/1
1 CAPSULE, DELAYED RELEASE PELLETS ORAL
Qty: 120 CAPSULE | Refills: 11 | Status: SHIPPED | OUTPATIENT
Start: 2024-10-30

## 2024-10-30 RX ADMIN — INFLUENZA VIRUS VACCINE 0.5 ML: 15; 15; 15 SUSPENSION INTRAMUSCULAR at 08:10

## 2024-10-30 RX ADMIN — HEPARIN 500 UNITS: 100 SYRINGE at 07:10

## 2024-10-30 RX ADMIN — Medication 10 ML: at 07:10

## 2025-01-27 ENCOUNTER — TELEPHONE (OUTPATIENT)
Dept: INTERNAL MEDICINE | Facility: CLINIC | Age: 25
End: 2025-01-27
Payer: MEDICAID

## 2025-01-27 NOTE — TELEPHONE ENCOUNTER
Dr. Mtz,      [facility] is requesting a refill for VERIFINE PEN NEEDLE 8 MM, 100 CT . SUBSTITUTE FOR EASY TOUCH. PERHAPS I overlooked, but I do not see it on her med list.  Please advise.

## 2025-01-29 DIAGNOSIS — E08.9 DIABETES MELLITUS DUE TO UNDERLYING CONDITION WITHOUT COMPLICATION, WITH LONG-TERM CURRENT USE OF INSULIN: Primary | ICD-10-CM

## 2025-01-29 DIAGNOSIS — Z79.4 DIABETES MELLITUS DUE TO UNDERLYING CONDITION WITHOUT COMPLICATION, WITH LONG-TERM CURRENT USE OF INSULIN: Primary | ICD-10-CM

## 2025-01-29 RX ORDER — PEN NEEDLE, DIABETIC 30 GX3/16"
NEEDLE, DISPOSABLE MISCELLANEOUS
Qty: 100 EACH | Refills: 6 | Status: SHIPPED | OUTPATIENT
Start: 2025-01-29

## 2025-02-06 ENCOUNTER — INFUSION (OUTPATIENT)
Dept: INFUSION THERAPY | Facility: HOSPITAL | Age: 25
End: 2025-02-06
Attending: INTERNAL MEDICINE
Payer: MEDICAID

## 2025-02-06 VITALS
DIASTOLIC BLOOD PRESSURE: 78 MMHG | SYSTOLIC BLOOD PRESSURE: 121 MMHG | TEMPERATURE: 99 F | RESPIRATION RATE: 20 BRPM | BODY MASS INDEX: 14.93 KG/M2 | HEIGHT: 61 IN | HEART RATE: 115 BPM

## 2025-02-06 DIAGNOSIS — E84.0 CYSTIC FIBROSIS OF THE LUNG: Primary | ICD-10-CM

## 2025-02-06 PROCEDURE — 63600175 PHARM REV CODE 636 W HCPCS

## 2025-02-06 PROCEDURE — 25000003 PHARM REV CODE 250

## 2025-02-06 PROCEDURE — A4216 STERILE WATER/SALINE, 10 ML: HCPCS

## 2025-02-06 PROCEDURE — 96523 IRRIG DRUG DELIVERY DEVICE: CPT

## 2025-02-06 RX ORDER — SODIUM CHLORIDE 0.9 % (FLUSH) 0.9 %
10 SYRINGE (ML) INJECTION
Status: DISCONTINUED | OUTPATIENT
Start: 2025-02-06 | End: 2025-02-06 | Stop reason: HOSPADM

## 2025-02-06 RX ORDER — HEPARIN 100 UNIT/ML
500 SYRINGE INTRAVENOUS
Status: DISCONTINUED | OUTPATIENT
Start: 2025-02-06 | End: 2025-02-06 | Stop reason: HOSPADM

## 2025-02-06 RX ORDER — HEPARIN 100 UNIT/ML
500 SYRINGE INTRAVENOUS
OUTPATIENT
Start: 2025-02-06

## 2025-02-06 RX ORDER — SODIUM CHLORIDE 0.9 % (FLUSH) 0.9 %
10 SYRINGE (ML) INJECTION
OUTPATIENT
Start: 2025-02-06

## 2025-02-06 RX ADMIN — HEPARIN 500 UNITS: 100 SYRINGE at 07:02

## 2025-02-06 RX ADMIN — SODIUM CHLORIDE, PRESERVATIVE FREE 10 ML: 5 INJECTION INTRAVENOUS at 07:02

## 2025-02-06 NOTE — NURSING
Pt in wheelchair accompanied by mother presented for MPF, pt denied pain and other complaints; pt is WC bound with history of Cystic Fibrosis and RA; right port flushed easily, no blood return, pt & mother stated sometimes do not get a blood return, pt unable to lift right arm over her head due to RA; tolerated without incident; after discharge escorted pt & mom the loading dock scale; there was no weight chair on the wheelchair, pt & mother said they do not have time to weigh the wheelchair and will call with the empty wheelchair weight if they find it at home; pt weighed 120 pounds in the wheelchair; AVS given.

## 2025-03-10 DIAGNOSIS — E08.9 DIABETES MELLITUS DUE TO UNDERLYING CONDITION WITHOUT COMPLICATION, WITH LONG-TERM CURRENT USE OF INSULIN: ICD-10-CM

## 2025-03-10 DIAGNOSIS — Z79.4 DIABETES MELLITUS DUE TO UNDERLYING CONDITION WITHOUT COMPLICATION, WITH LONG-TERM CURRENT USE OF INSULIN: ICD-10-CM

## 2025-03-10 DIAGNOSIS — E55.9 VITAMIN D DEFICIENCY: ICD-10-CM

## 2025-03-10 RX ORDER — INSULIN GLARGINE 300 U/ML
60 INJECTION, SOLUTION SUBCUTANEOUS DAILY
Qty: 1 PEN | Refills: 11 | Status: SHIPPED | OUTPATIENT
Start: 2025-03-10

## 2025-03-10 RX ORDER — VIT C/E/ZN/COPPR/LUTEIN/ZEAXAN 250MG-90MG
1000 CAPSULE ORAL DAILY
Qty: 90 CAPSULE | Refills: 6 | Status: SHIPPED | OUTPATIENT
Start: 2025-03-10 | End: 2025-03-12 | Stop reason: SDUPTHER

## 2025-03-10 RX ORDER — INSULIN LISPRO 100 [IU]/ML
25 INJECTION, SOLUTION INTRAVENOUS; SUBCUTANEOUS
Qty: 3 EACH | Refills: 11 | Status: SHIPPED | OUTPATIENT
Start: 2025-03-10

## 2025-03-12 DIAGNOSIS — E55.9 VITAMIN D DEFICIENCY: ICD-10-CM

## 2025-03-12 RX ORDER — VIT C/E/ZN/COPPR/LUTEIN/ZEAXAN 250MG-90MG
1000 CAPSULE ORAL DAILY
Qty: 90 CAPSULE | Refills: 6 | Status: SHIPPED | OUTPATIENT
Start: 2025-03-12

## 2025-03-14 DIAGNOSIS — J45.909 ASTHMA, UNSPECIFIED ASTHMA SEVERITY, UNSPECIFIED WHETHER COMPLICATED, UNSPECIFIED WHETHER PERSISTENT: Primary | ICD-10-CM

## 2025-03-17 ENCOUNTER — TELEPHONE (OUTPATIENT)
Dept: INTERNAL MEDICINE | Facility: CLINIC | Age: 25
End: 2025-03-17
Payer: MEDICAID

## 2025-03-18 RX ORDER — MONTELUKAST SODIUM 10 MG/1
10 TABLET ORAL DAILY
Qty: 90 TABLET | Refills: 6 | Status: SHIPPED | OUTPATIENT
Start: 2025-03-18

## 2025-03-21 ENCOUNTER — TELEPHONE (OUTPATIENT)
Dept: INTERNAL MEDICINE | Facility: CLINIC | Age: 25
End: 2025-03-21
Payer: MEDICAID

## 2025-03-21 DIAGNOSIS — E08.9 DIABETES MELLITUS DUE TO UNDERLYING CONDITION WITHOUT COMPLICATION, WITH LONG-TERM CURRENT USE OF INSULIN: Primary | ICD-10-CM

## 2025-03-21 DIAGNOSIS — Z79.4 DIABETES MELLITUS DUE TO UNDERLYING CONDITION WITHOUT COMPLICATION, WITH LONG-TERM CURRENT USE OF INSULIN: Primary | ICD-10-CM

## 2025-03-21 RX ORDER — BLOOD-GLUCOSE SENSOR
1 EACH MISCELLANEOUS
Qty: 5 EACH | Refills: 5 | Status: SHIPPED | OUTPATIENT
Start: 2025-03-21 | End: 2026-03-21

## 2025-03-21 NOTE — TELEPHONE ENCOUNTER
Received call from patient mother following up on her daughters medication.Stated she requested refills on pt's Candida D and Singular. Contacted [facility]. Spoke to pharmacist who stated the Rx for Candida D will have to be OTC medication , it isn't covered by insurance. Patient Singular Rx was filled and medication was shipped to patient residence.Pt home address verified. Pharmacist also stated he doesn't have the Freestyle Coral; However they do have the Dexcom 7. Notified pharmacist that a request to possible change Free style Coral order to Dexcom ; if physician wishes. Left voice message on patient mother phone to RTC to clinic if further assistance is needed.

## 2025-04-21 DIAGNOSIS — E08.9 DIABETES MELLITUS DUE TO UNDERLYING CONDITION WITHOUT COMPLICATION, WITH LONG-TERM CURRENT USE OF INSULIN: ICD-10-CM

## 2025-04-21 DIAGNOSIS — Z79.4 DIABETES MELLITUS DUE TO UNDERLYING CONDITION WITHOUT COMPLICATION, WITH LONG-TERM CURRENT USE OF INSULIN: ICD-10-CM

## 2025-04-21 RX ORDER — LISINOPRIL 5 MG/1
2.5 TABLET ORAL DAILY
Qty: 45 TABLET | Refills: 3 | Status: SHIPPED | OUTPATIENT
Start: 2025-04-21 | End: 2026-04-21

## 2025-05-08 DIAGNOSIS — Z79.4 DIABETES MELLITUS DUE TO UNDERLYING CONDITION WITHOUT COMPLICATION, WITH LONG-TERM CURRENT USE OF INSULIN: ICD-10-CM

## 2025-05-08 DIAGNOSIS — E08.9 DIABETES MELLITUS DUE TO UNDERLYING CONDITION WITHOUT COMPLICATION, WITH LONG-TERM CURRENT USE OF INSULIN: ICD-10-CM

## 2025-06-10 RX ORDER — HEPARIN 100 UNIT/ML
500 SYRINGE INTRAVENOUS
Status: CANCELLED | OUTPATIENT
Start: 2025-06-10

## 2025-06-10 RX ORDER — SODIUM CHLORIDE 0.9 % (FLUSH) 0.9 %
10 SYRINGE (ML) INJECTION
Status: CANCELLED | OUTPATIENT
Start: 2025-06-10

## 2025-06-10 NOTE — PROGRESS NOTES
[facility] INTERNAL MEDICINE  OUTPATIENT OFFICE VISIT NOTE    SUBJECTIVE:   Chief Complaint:   Chief Complaint   Patient presents with    Follow-up         HPI: Ms. Luna is a 22 yof with PMHx significant for cystic fibrosis, RF + rheumatoid arthritis, cystic fibrosis related pancreatic insufficiency and diabetes mellitus, protein/calorie malnutrition s/p J-tube placement who presents to [facility] IM clinic with her mother for routine followup.  She was last seen on 10/30/2024. She follows with Rheumatology, Dr. Tony at [facility] in [place].  She follows with [facility] clinic for cystic fibrosis every 3 months.     Patient presents to clinic with her mother.  Reports that she is doing well.  Since last visit they have establish care with an endocrinologist at [facility] in [place].  Significant improvement in A1c since getting Dexcom.  A1c today 6.7.  Currently on Toujeo 62 units nightly, short-acting 25 units t.i.d. with meals.  Denies any headaches, dizziness, chest pain, shortness of breath.  Overall, doing well.  She is compliant with all of her medications.       Constitutional: no fever/chills  EENT: no sore throat, ear pain, sinus pain/congestion, nasal congestion/drainage  Respiratory: no cough, no wheezing, no shortness of breath  Cardiovascular: no chest pain, no palpitations, no edema  Gastrointestinal: no nausea, vomiting, or diarrhea. No abdominal pain  Genitourinary: no dysuria, no urinary frequency or urgency, no hematuria  Integumentary: no skin rash or abnormal lesion  Neurologic: no headache, no dizziness, no weakness or numbness        Past Medical History:   Diagnosis Date    Cystic fibrosis     G tube feedings     Rheumatoid arthritis        Past Surgical History:   Procedure Laterality Date    MEDIPORT INSERTION, SINGLE         Social History     Socioeconomic History    Marital status: Single   Tobacco Use    Smoking status: Never    Smokeless tobacco: Never   Substance and  Sexual Activity    Alcohol use: Never    Drug use: Never    Sexual activity: Not Currently     Social Drivers of Health     Financial Resource Strain: Low Risk  (10/2/2024)    Received from [facility]    Overall Financial Resource Strain (CARDIA)     Difficulty of Paying Living Expenses: Not hard at all   Food Insecurity: No Food Insecurity (10/2/2024)    Received from [facility]    Hunger Vital Sign     Worried About Running Out of Food in the Last Year: Never true     Ran Out of Food in the Last Year: Never true   Transportation Needs: No Transportation Needs (10/2/2024)    Received from [facility]    PRAPARE - Transportation     Lack of Transportation (Medical): No     Lack of Transportation (Non-Medical): No   Physical Activity: Inactive (5/16/2022)    Exercise Vital Sign     Days of Exercise per Week: 0 days     Minutes of Exercise per Session: 0 min   Stress: No Stress Concern Present (10/2/2024)    Received from [facility]    [employer] [place] [employer] - Occupational Stress Questionnaire     Feeling of Stress : Not at all   Housing Stability: Low Risk  (10/2/2024)    Received from [facility]    Housing Stability Vital Sign     Unable to Pay for Housing in the Last Year: No     Number of Times Moved in the Last Year: 1     Homeless in the Last Year: No            OBJECTIVE:     Vital signs:   Vitals:    06/11/25 0808   BP: 105/68   Pulse: (!) 112   Resp: 16         Physical Examination:  General:  Thin-appearing female, no acute distress.  Eye: clear conjunctiva, eyelids normal  Head: normocephalic and atraumatic  Neck: full range of motion, supple  Respiratory: clear to auscultation bilaterally without wheezes, rales, rhonchi  Cardiovascular:  Tachycardic, regular rhythm without murmurs.   Gastrointestinal: soft, non-tender, non-distended with normal bowel sounds in all four quadrants.  J-tube in place.  Musculoskeletal:  Contracted lower extremities bilaterally.   Wheelchair-bound.  Integumentary: no rashes or skin lesions present, no erythema  Neurologic: AAO x 3, no dysarthria.  Psychiatric: cooperative with exam, good eye contact.         Current Outpatient Medications   Medication Instructions    albuterol (PROVENTIL/VENTOLIN HFA) 90 mcg/actuation inhaler USE 2 PUFFS BY MOUTH TWICE DAILY PRIOR TO HYPERSAL TX & EVERY 4HRS AS NEEDED COUGH/WHEEZING    anakinra (KINERET) 100 mg/0.67 mL injection Inject 100 mg (0.67 mL) into the skin once daily in the morning.    anakinra (KINERET) 100 mg/0.67 mL injection Inject 0.67 mLs under the skin every morning.    anakinra (KINERET) 100 mg/0.67 mL injection Inject 0.67 mLs under the skin in the morning.    anakinra (KINERET) 100 mg/0.67 mL injection Inject 0.67 mLs (100 mg) under the skin in the morning.    azithromycin (ZITHROMAX) 500 MG tablet See Instructions, 1 tab(s) Oral Monday, Wednesday and Friday, # 13 tab(s), 5 Refill(s), Pharmacy: [facility], 152.4, cm, Height/Length Dosing, 12/07/21 13:16:00 CST, 32.4, kg, Weight Dosing, 03/10/22 11:00:00 CST    BETHKIS 300 mg/4 mL Nebu No dose, route, or frequency recorded.    BLOOD PRESSURE CUFF Misc 1 each, Misc.(Non-Drug; Combo Route), Daily    blood sugar diagnostic Strp 1 each, Misc.(Non-Drug; Combo Route), 3 times daily    blood-glucose meter kit Use as instructed    blood-glucose sensor (DEXCOM G7 SENSOR) Kim 1 Device, Misc.(Non-Drug; Combo Route), Every 10 days    cholecalciferol (vitamin D3) (VITAMIN D3) 1,000 Units, Oral, Daily    CREON 24,000-76,000 -120,000 unit capsule 1 capsule, Oral, 3 times daily with meals    cyproheptadine (PERIACTIN) 4 mg, Oral, Daily    denosumab (PROLIA) 60 mg    dornase shea (PULMOZYME) 2.5 mg    ferrous sulfate (FEROSUL) 220 mg, Per J Tube, Every Mon, Wed, Fri    hydroxychloroquine (PLAQUENIL) 200 mg, Oral, Daily    insulin lispro 25 Units, Subcutaneous, 3 times daily with meals    lancets (ONETOUCH ULTRASOFT LANCETS) [facility] 1 each,  "Misc.(Non-Drug; Combo Route), 3 times daily    lisinopriL (PRINIVIL,ZESTRIL) 2.5 mg, Oral, Daily    montelukast (SINGULAIR) 10 mg, Oral, Daily    naproxen (NAPROSYN) 375 mg, Oral, Daily PRN    pen needle, diabetic 31 gauge x 5/16" Ndle AS DIRECTED.... FOUR TIMES DAILY (with toujeo & humalog)    sodium chloride 7% 7 % nebulizer solution Nebulization, As needed (PRN)    TOUJEO MAX U-300 SOLOSTAR 60 Units, Subcutaneous, Daily    TRIKAFTA 100-50-75 mg(d) /150 mg (n) TbSQ No dose, route, or frequency recorded.         ASSESSMENT & PLAN:     Tachycardia  -EKG at prior clinic visit with sinus tachycardia, echocardiogram unremarkable.    -patient denies any dizziness, chest pain, palpitations, shortness for breath.    -encouraged increase water intake.  -Last TFTs WNL.  -ED precautions provided.  -discussed ordering Holter monitor, patient and mother both declined at this time.  We will revisit discussion at next visit.    Pancreatic insufficiency in setting of CF  Diabetes Mellitus related to above  -A1C previously well controlled in 5-7s.  -recent A1c trend: 11.5 -> 12.2 -> 12.7 -> 10.4 -> 6.7.  -Continue Creon as it relates to pancreatic insuffiency  -Current regimen: Toujeo 62 units nightly, short-acting 25 units t.i.d. WM  -follows with endocrinologist at [facility] in [place].  -Micro/alb ratio elevated, continue lisinopril.    Cystic fibrosis  -Follows with [facility] cystic fibrosis Clinic, regular follow ups every 3 months.  -Continue azithromycin daily  -Continue Trikafta (Elexacaftor/tezacaftor/ivacaftor) for CF  -Continue Pulmozyme neb  -Continue Bethkis (Tobramycin)     RF+ Rheumatoid Arthritis  -Follows with Dr. Tony at [facility] in [place]  -Continue Anikinra  -Continue hydroxychloroquine      Osteoporosis  -2/2 chronic steroid use.   -Continue Prolia, prescribed by Rheum     Bilateral knee contractures  -Evaluated by Orthopedic surgery [place], deemed not to be good surgical " candidate and regards to bilateral knee replacements  -Botox was suggested but patient declined at this time.  -Currently on baclofen per neurology.    Malnutrition  -J-tube in place, receives tube twice daily in addition to oral feeding.    Vitamin D Deficiency  -vitamin-D 20.7.    -Continue vitamin-D supplementation.       Health Maintenance   Pneumococcal vaccine: 12/27/2023  TDAP: 7/10/2023  Influenza vaccine:  12/27/2023  Shingrix vaccine: n/a  Cervical Cancer: pap w/ OBGYN in 2018  MMG: n/a  Colon CA screening:  n/a  Lung Cancer Screening: n/a  Hepatitis C: 04/12/2022  HIV:   COVID-19: declined         Return to clinic in 6 months.       Bahman Mtz MD  [employer] [facility] PGY-2

## 2025-06-11 ENCOUNTER — OFFICE VISIT (OUTPATIENT)
Dept: INTERNAL MEDICINE | Facility: CLINIC | Age: 25
End: 2025-06-11
Payer: MEDICAID

## 2025-06-11 ENCOUNTER — INFUSION (OUTPATIENT)
Dept: INFUSION THERAPY | Facility: HOSPITAL | Age: 25
End: 2025-06-11
Attending: INTERNAL MEDICINE
Payer: MEDICAID

## 2025-06-11 VITALS
RESPIRATION RATE: 16 BRPM | DIASTOLIC BLOOD PRESSURE: 68 MMHG | OXYGEN SATURATION: 98 % | SYSTOLIC BLOOD PRESSURE: 105 MMHG | HEART RATE: 112 BPM

## 2025-06-11 VITALS
RESPIRATION RATE: 18 BRPM | TEMPERATURE: 98 F | HEART RATE: 121 BPM | SYSTOLIC BLOOD PRESSURE: 103 MMHG | OXYGEN SATURATION: 95 % | DIASTOLIC BLOOD PRESSURE: 65 MMHG

## 2025-06-11 DIAGNOSIS — M24.562 BILATERAL KNEE CONTRACTURES: ICD-10-CM

## 2025-06-11 DIAGNOSIS — E08.9 DIABETES MELLITUS DUE TO UNDERLYING CONDITION WITHOUT COMPLICATION, WITH LONG-TERM CURRENT USE OF INSULIN: Primary | ICD-10-CM

## 2025-06-11 DIAGNOSIS — M24.561 BILATERAL KNEE CONTRACTURES: ICD-10-CM

## 2025-06-11 DIAGNOSIS — E55.9 VITAMIN D DEFICIENCY: ICD-10-CM

## 2025-06-11 DIAGNOSIS — Z79.4 DIABETES MELLITUS DUE TO UNDERLYING CONDITION WITHOUT COMPLICATION, WITH LONG-TERM CURRENT USE OF INSULIN: Primary | ICD-10-CM

## 2025-06-11 DIAGNOSIS — E84.0 CYSTIC FIBROSIS OF THE LUNG: Primary | ICD-10-CM

## 2025-06-11 DIAGNOSIS — R00.0 TACHYCARDIA: ICD-10-CM

## 2025-06-11 DIAGNOSIS — E84.0 CYSTIC FIBROSIS OF THE LUNG: ICD-10-CM

## 2025-06-11 LAB — HBA1C MFR BLD: 6.7 %

## 2025-06-11 PROCEDURE — 96523 IRRIG DRUG DELIVERY DEVICE: CPT

## 2025-06-11 PROCEDURE — A4216 STERILE WATER/SALINE, 10 ML: HCPCS

## 2025-06-11 PROCEDURE — 25000003 PHARM REV CODE 250

## 2025-06-11 PROCEDURE — 99213 OFFICE O/P EST LOW 20 MIN: CPT | Mod: PBBFAC,25

## 2025-06-11 PROCEDURE — 83036 HEMOGLOBIN GLYCOSYLATED A1C: CPT | Mod: PBBFAC

## 2025-06-11 PROCEDURE — 63600175 PHARM REV CODE 636 W HCPCS

## 2025-06-11 RX ORDER — HEPARIN 100 UNIT/ML
500 SYRINGE INTRAVENOUS
Status: DISCONTINUED | OUTPATIENT
Start: 2025-06-11 | End: 2025-06-11 | Stop reason: HOSPADM

## 2025-06-11 RX ORDER — SODIUM CHLORIDE 0.9 % (FLUSH) 0.9 %
10 SYRINGE (ML) INJECTION
OUTPATIENT
Start: 2025-06-11

## 2025-06-11 RX ORDER — HEPARIN 100 UNIT/ML
500 SYRINGE INTRAVENOUS
OUTPATIENT
Start: 2025-06-11

## 2025-06-11 RX ORDER — SODIUM CHLORIDE 0.9 % (FLUSH) 0.9 %
10 SYRINGE (ML) INJECTION
Status: DISCONTINUED | OUTPATIENT
Start: 2025-06-11 | End: 2025-06-11 | Stop reason: HOSPADM

## 2025-06-11 RX ADMIN — HEPARIN 500 UNITS: 100 SYRINGE at 07:06

## 2025-06-11 RX ADMIN — Medication 10 ML: at 07:06

## 2025-06-11 NOTE — NURSING
Patient is here for Mediport flush. She arrived to clinic in wheelchair accompanied by her mother. She voices no c/o. Offered to take patient downstairs to loading dock to weigh her, but her mother said patient has another appt at 7:30 and they were running late.   R chest mediport flushed without difficulty, but no blood return. Patient states sometimes it gives a blood return and sometimes it doesn't.

## 2025-06-13 DIAGNOSIS — E55.9 VITAMIN D DEFICIENCY: ICD-10-CM

## 2025-06-13 DIAGNOSIS — K86.89 PANCREATIC INSUFFICIENCY: ICD-10-CM

## 2025-06-16 RX ORDER — VIT C/E/ZN/COPPR/LUTEIN/ZEAXAN 250MG-90MG
1000 CAPSULE ORAL DAILY
Qty: 90 CAPSULE | Refills: 6 | Status: SHIPPED | OUTPATIENT
Start: 2025-06-16

## 2025-06-16 RX ORDER — PANCRELIPASE 24000; 76000; 120000 [USP'U]/1; [USP'U]/1; [USP'U]/1
1 CAPSULE, DELAYED RELEASE PELLETS ORAL
Qty: 120 CAPSULE | Refills: 11 | Status: SHIPPED | OUTPATIENT
Start: 2025-06-16